# Patient Record
Sex: FEMALE | Race: AMERICAN INDIAN OR ALASKA NATIVE
[De-identification: names, ages, dates, MRNs, and addresses within clinical notes are randomized per-mention and may not be internally consistent; named-entity substitution may affect disease eponyms.]

---

## 2019-08-05 ENCOUNTER — HOSPITAL ENCOUNTER (EMERGENCY)
Dept: HOSPITAL 5 - ED | Age: 49
Discharge: HOME | End: 2019-08-05
Payer: MEDICAID

## 2019-08-05 VITALS — DIASTOLIC BLOOD PRESSURE: 97 MMHG | SYSTOLIC BLOOD PRESSURE: 152 MMHG

## 2019-08-05 DIAGNOSIS — Z98.890: ICD-10-CM

## 2019-08-05 DIAGNOSIS — Z88.8: ICD-10-CM

## 2019-08-05 DIAGNOSIS — E11.22: ICD-10-CM

## 2019-08-05 DIAGNOSIS — Z79.899: ICD-10-CM

## 2019-08-05 DIAGNOSIS — Z87.891: ICD-10-CM

## 2019-08-05 DIAGNOSIS — R10.9: Primary | ICD-10-CM

## 2019-08-05 DIAGNOSIS — N18.6: ICD-10-CM

## 2019-08-05 DIAGNOSIS — I12.0: ICD-10-CM

## 2019-08-05 DIAGNOSIS — Z98.84: ICD-10-CM

## 2019-08-05 DIAGNOSIS — Z99.2: ICD-10-CM

## 2019-08-05 LAB
ALBUMIN SERPL-MCNC: 4.2 G/DL (ref 3.9–5)
ALT SERPL-CCNC: 20 UNITS/L (ref 7–56)
BASOPHILS # (AUTO): 0 K/MM3 (ref 0–0.1)
BASOPHILS NFR BLD AUTO: 1 % (ref 0–1.8)
BILIRUB UR QL STRIP: (no result)
BLOOD UR QL VISUAL: (no result)
BUN SERPL-MCNC: 56 MG/DL (ref 7–17)
BUN/CREAT SERPL: 12 %
CALCIUM SERPL-MCNC: 10.8 MG/DL (ref 8.4–10.2)
EOSINOPHIL # BLD AUTO: 0.3 K/MM3 (ref 0–0.4)
EOSINOPHIL NFR BLD AUTO: 6.4 % (ref 0–4.3)
HCT VFR BLD CALC: 34.9 % (ref 30.3–42.9)
HEMOLYSIS INDEX: 5
HGB BLD-MCNC: 11.4 GM/DL (ref 10.1–14.3)
HYALINE CASTS #/AREA URNS LPF: 2 /LPF
LYMPHOCYTES # BLD AUTO: 1.6 K/MM3 (ref 1.2–5.4)
LYMPHOCYTES NFR BLD AUTO: 34.2 % (ref 13.4–35)
MCHC RBC AUTO-ENTMCNC: 33 % (ref 30–34)
MCV RBC AUTO: 92 FL (ref 79–97)
MONOCYTES # (AUTO): 0.7 K/MM3 (ref 0–0.8)
MONOCYTES % (AUTO): 13.8 % (ref 0–7.3)
MUCOUS THREADS #/AREA URNS HPF: (no result) /HPF
PH UR STRIP: 7 [PH] (ref 5–7)
PLATELET # BLD: 162 K/MM3 (ref 140–440)
PROT UR STRIP-MCNC: >500 MG/DL
RBC # BLD AUTO: 3.81 M/MM3 (ref 3.65–5.03)
RBC #/AREA URNS HPF: 2 /HPF (ref 0–6)
UROBILINOGEN UR-MCNC: < 2 MG/DL (ref ?–2)
WBC #/AREA URNS HPF: 2 /HPF (ref 0–6)

## 2019-08-05 PROCEDURE — 99284 EMERGENCY DEPT VISIT MOD MDM: CPT

## 2019-08-05 PROCEDURE — 81001 URINALYSIS AUTO W/SCOPE: CPT

## 2019-08-05 PROCEDURE — 74176 CT ABD & PELVIS W/O CONTRAST: CPT

## 2019-08-05 PROCEDURE — 96374 THER/PROPH/DIAG INJ IV PUSH: CPT

## 2019-08-05 PROCEDURE — 80053 COMPREHEN METABOLIC PANEL: CPT

## 2019-08-05 PROCEDURE — 83690 ASSAY OF LIPASE: CPT

## 2019-08-05 PROCEDURE — 85025 COMPLETE CBC W/AUTO DIFF WBC: CPT

## 2019-08-05 PROCEDURE — 36415 COLL VENOUS BLD VENIPUNCTURE: CPT

## 2019-08-05 PROCEDURE — 96375 TX/PRO/DX INJ NEW DRUG ADDON: CPT

## 2019-08-05 NOTE — EMERGENCY DEPARTMENT REPORT
HPI





- General


Chief Complaint: Abdominal Pain


Time Seen by Provider: 19 02:56





- HPI


HPI: 





Room 6








The patient is a 48-year-old female presenting with a chief complaint abdominal 

pain.  The patient states for the past 3-4 days she's had intermittent left-

sided abdominal pain described as sharp in nature.  Patient admits to nausea and

vomiting but denies diarrhea or fever.  The patient has a history of end-stage 

renal disease and states she does produce urine but denies dysuria.  Patient was

last dialyzed yesterday.  The patient gives her pain a score of 10/10.  The 

patient also notes she broke a bone in her back a little over 1 month ago and 

has a back brace at home








Location: [See above]


Duration: [See above]


Quality:  [See above]


Severity:  [See above]


Modifying factors: [see above]


Context: [see above]


Mode of transportation: [not driving]





ED Past Medical Hx





- Past Medical History


Previous Medical History?: Yes


Hx Hypertension: Yes


Hx Diabetes: Yes


Hx Renal Disease: Yes (ESRD)





- Surgical History


Past Surgical History?: Yes


Additional Surgical History: L arm fistula, gastric bypass





- Family History


Family history: no significant





- Social History


Smoking Status: Former Smoker (none 10 years)


Substance Use Type: None (denies illicit drug use)





- Medications


Home Medications: 


                                Home Medications











 Medication  Instructions  Recorded  Confirmed  Last Taken  Type


 


Famotidine [Pepcid] 20 mg PO BID #20 tablet 19  Unknown Rx


 


HYDROcodone/APAP 5-325 [[place] 1 - 2 each PO Q6HR PRN #14 tablet 19  

Unknown Rx





5/325]     














ED Review of Systems


ROS: 


Stated complaint: LEFT SIDE PAIN/ABD PAIN


Other details as noted in HPI





Constitutional: denies: fever


Eyes: denies: eye pain


ENT: denies: throat pain


Respiratory: no symptoms reported


Cardiovascular: denies: chest pain


Endocrine: no symptoms reported


Gastrointestinal: abdominal pain, nausea, vomiting.  denies: diarrhea


Genitourinary: denies: dysuria


Musculoskeletal: back pain (chronic)


Neurological: denies: headache





Physical Exam





- Physical Exam


Vital Signs: 


                                   Vital Signs











  19





  02:35


 


Temperature 98.1 F


 


Pulse Rate 107 H


 


Respiratory 21





Rate 


 


Blood Pressure 221/137


 


Blood Pressure 221/137





[Right] 


 


O2 Sat by Pulse 90





Oximetry 











Physical Exam: 





GENERAL: The patient is well-developed well-nourished female lying on stretcher 

not appearing to be in acute distress. []


HEENT: Normocephalic.  Atraumatic.  Extraocular motions are intact.  Patient has

 moist mucous membranes.


NECK: Supple.  Trachea midline


CHEST/LUNGS: Clear to auscultation.  There is no respiratory distress noted.


HEART/CARDIOVASCULAR: Regular.  There is no tachycardia.  There is no gallop rub

 or murmur.


ABDOMEN: Abdomen is soft, with tenderness to palpation left upper quadrant.  No 

rebound or guarding.  Patient has normal bowel sounds.  There is no abdominal 

distention.


SKIN: There is no rash.  There is no edema.  There is no diaphoresis.


NEURO: The patient is awake, alert, and oriented.  The patient is cooperative.  

The patient has normal speech


MUSCULOSKELETAL:  There is no evidence of acute injury.





ED Course


                                   Vital Signs











  19





  02:35


 


Temperature 98.1 F


 


Pulse Rate 107 H


 


Respiratory 21





Rate 


 


Blood Pressure 221/137


 


Blood Pressure 221/137





[Right] 


 


O2 Sat by Pulse 90





Oximetry 














ED Medical Decision Making





- Lab Data


Result diagrams: 


                                 19 03:14





                                 19 03:14





                                Laboratory Tests











  19





  03:14 03:14 03:14


 


WBC  4.8  


 


RBC  3.81  


 


Hgb  11.4  


 


Hct  34.9  


 


MCV  92  


 


MCH  30  


 


MCHC  33  


 


RDW  18.7 H  


 


Plt Count  162  


 


Lymph % (Auto)  34.2  


 


Mono % (Auto)  13.8 H  


 


Eos % (Auto)  6.4 H  


 


Baso % (Auto)  1.0  


 


Lymph #  1.6  


 


Mono #  0.7  


 


Eos #  0.3  


 


Baso #  0.0  


 


Seg Neutrophils %  44.6  


 


Seg Neutrophils #  2.1  


 


Sodium   140 


 


Potassium   4.4 


 


Chloride   96.7 L 


 


Carbon Dioxide   22 


 


Anion Gap   26 


 


BUN   56 H 


 


Creatinine   4.5 H 


 


Estimated GFR   13 


 


BUN/Creatinine Ratio   12 


 


Glucose   98 


 


Calcium   10.8 H 


 


Total Bilirubin   0.70 


 


AST   30 


 


ALT   20 


 


Alkaline Phosphatase   164 H 


 


Total Protein   8.2 


 


Albumin   4.2 


 


Albumin/Globulin Ratio   1.1 


 


Lipase    69 H


 


Urine Color   


 


Urine Turbidity   


 


Urine pH   


 


Ur Specific Gravity   


 


Urine Protein   


 


Urine Glucose (UA)   


 


Urine Ketones   


 


Urine Blood   


 


Urine Nitrite   


 


Urine Bilirubin   


 


Urine Urobilinogen   


 


Ur Leukocyte Esterase   


 


Urine WBC (Auto)   


 


Urine RBC (Auto)   


 


Hyaline Casts   


 


Urine Mucus   














  19





  03:54


 


WBC 


 


RBC 


 


Hgb 


 


Hct 


 


MCV 


 


MCH 


 


MCHC 


 


RDW 


 


Plt Count 


 


Lymph % (Auto) 


 


Mono % (Auto) 


 


Eos % (Auto) 


 


Baso % (Auto) 


 


Lymph # 


 


Mono # 


 


Eos # 


 


Baso # 


 


Seg Neutrophils % 


 


Seg Neutrophils # 


 


Sodium 


 


Potassium 


 


Chloride 


 


Carbon Dioxide 


 


Anion Gap 


 


BUN 


 


Creatinine 


 


Estimated GFR 


 


BUN/Creatinine Ratio 


 


Glucose 


 


Calcium 


 


Total Bilirubin 


 


AST 


 


ALT 


 


Alkaline Phosphatase 


 


Total Protein 


 


Albumin 


 


Albumin/Globulin Ratio 


 


Lipase 


 


Urine Color  Yellow


 


Urine Turbidity  Clear


 


Urine pH  7.0


 


Ur Specific Gravity  1.013


 


Urine Protein  >500


 


Urine Glucose (UA)  50


 


Urine Ketones  Neg


 


Urine Blood  Neg


 


Urine Nitrite  Neg


 


Urine Bilirubin  Neg


 


Urine Urobilinogen  < 2.0


 


Ur Leukocyte Esterase  Neg


 


Urine WBC (Auto)  2.0


 


Urine RBC (Auto)  2.0


 


Hyaline Casts  2


 


Urine Mucus  Few














- Radiology Data


Radiology results: report reviewed (CT abdomen and pelvis), image reviewed (CT 

abdomen and pelvis)





[address] [address] [place]

Scan Report Signed Patient: TIKI WHEAT MR#: [id number] : 1970 

Acct:[id number] Age/Sex: 48 / F ADM Date: 19 Loc: ED Attending Dr: 

Ordering Physician: HONORIO BROWNLEE MD Date of Service: 19 Procedure(s): CT 

abdomen pelvis wo con Accession Number(s): [id number] cc: HONORIO BROWNLEE MD CT of the

abdomen and pelvis without contrast INDICATION: Left-sided abdominal pain 

COMPARISON: None FINDINGS: There is moderately severe cardiomegaly with a 

moderate-sized right pleural effusion. The liver, spleen, pancreas and adrenal 

glands are grossly normal. Kidneys are extremely atrophic consistent with renal 

failure. Gallbladder has been removed. No biliary tree dilation is seen. No 

fluid or adenopathy in the upper abdomen. Postoperative changes are seen in the 

left upper quadrant with a moderately dilated fluid-filled structure in the left

upper quadrant appearing to be remaining stomach. No perforation is seen. There 

is a small amount of ascites in the upper abdomen. There is moderate vascular 

calcification without aneurysm. CT of the pelvis shows generalized anasarca. 

There is a small amount of free pelvic fluid. No uterine or adnexal masses. No 

definite bowel obstruction. Appendix is not seen. Bony changes are seen 

consistent with renal osteodystrophy with a severely comminuted fracture of L3 

seen. IMPRESSION: Fluid-filled stomach with other chronic findings as described.

No definite acute process seen. Automated exposure control was utilized to 

diminish radiation dose. Signer Name: Chilango Mckinney MD Signed: 2019 4:17 AM

Workstation Name: [id number] Transcribed By: BRONWYN Dictated By: Chilango Mckinney MD Electronically Authenticated By: Chilango Mckinney MD Signed Date/Time: 

19 DD/DT: 19 TD/TT: 





- Differential Diagnosis


diverticulitis, peptic ulcer disease,


Critical care attestation.: 


If time is entered above; I have spent that time in minutes in the direct care 

of this critically ill patient, excluding procedure time.








ED Disposition


Clinical Impression: 


 Acute abdominal pain





Disposition: - TO HOME OR SELFCARE


Is pt being admited?: No


Does the pt Need Aspirin: No


Condition: Stable


Instructions:  Abdominal Pain (ED)


Additional Instructions: 


Return to the emergency department immediately should you develop worsening 

symptoms, fever, inability to tolerate food or liquid or any other concerns.


Prescriptions: 


HYDROcodone/APAP 5-325 [Norco 5/325] 1 - 2 each PO Q6HR PRN #14 tablet


 PRN Reason: Pain


Famotidine [Pepcid] 20 mg PO BID #20 tablet


Referrals: 


PRIMARY CARE,MD [Primary Care Provider] - 3-5 Days


Time of Disposition: 05:17

## 2019-08-05 NOTE — CAT SCAN REPORT
CT of the abdomen and pelvis without contrast



INDICATION: Left-sided abdominal pain



COMPARISON: None



FINDINGS: There is moderately severe cardiomegaly with a moderate-sized right pleural effusion. The l
iver, spleen, pancreas and adrenal glands are grossly normal. Kidneys are extremely atrophic consiste
nt with renal failure. Gallbladder has been removed. No biliary tree dilation is seen. No fluid or ad
enopathy in the upper abdomen. Postoperative changes are seen in the left upper quadrant with a moder
ately dilated fluid-filled structure in the left upper quadrant appearing to be remaining stomach. No
 perforation is seen. There is a small amount of ascites in the upper abdomen. There is moderate vasc
ular calcification without aneurysm.



CT of the pelvis shows generalized anasarca. There is a small amount of free pelvic fluid. No uterine
 or adnexal masses. No definite bowel obstruction. Appendix is not seen. Bony changes are seen consis
tent with renal osteodystrophy with a severely comminuted fracture of L3 seen.



IMPRESSION: Fluid-filled stomach with other chronic findings as described. No definite acute process 
seen.



Automated exposure control was utilized to diminish radiation dose.







Signer Name: Chilango Mckinney MD 

Signed: 8/5/2019 4:17 AM

 Workstation Name: [id number]

## 2020-10-17 ENCOUNTER — HOSPITAL ENCOUNTER (INPATIENT)
Dept: HOSPITAL 5 - ED | Age: 50
LOS: 2 days | Discharge: HOME | DRG: 280 | End: 2020-10-19
Attending: INTERNAL MEDICINE | Admitting: INTERNAL MEDICINE
Payer: MEDICAID

## 2020-10-17 DIAGNOSIS — Z86.73: ICD-10-CM

## 2020-10-17 DIAGNOSIS — Z79.82: ICD-10-CM

## 2020-10-17 DIAGNOSIS — Z53.29: ICD-10-CM

## 2020-10-17 DIAGNOSIS — I13.2: ICD-10-CM

## 2020-10-17 DIAGNOSIS — Z79.899: ICD-10-CM

## 2020-10-17 DIAGNOSIS — Z99.2: ICD-10-CM

## 2020-10-17 DIAGNOSIS — N18.6: ICD-10-CM

## 2020-10-17 DIAGNOSIS — E78.5: ICD-10-CM

## 2020-10-17 DIAGNOSIS — Z82.49: ICD-10-CM

## 2020-10-17 DIAGNOSIS — Z91.14: ICD-10-CM

## 2020-10-17 DIAGNOSIS — E11.22: ICD-10-CM

## 2020-10-17 DIAGNOSIS — I21.4: Primary | ICD-10-CM

## 2020-10-17 DIAGNOSIS — I50.31: ICD-10-CM

## 2020-10-17 DIAGNOSIS — Z91.013: ICD-10-CM

## 2020-10-17 LAB
BASOPHILS # (AUTO): 0.1 K/MM3 (ref 0–0.1)
BASOPHILS NFR BLD AUTO: 0.9 % (ref 0–1.8)
BUN SERPL-MCNC: 42 MG/DL (ref 7–17)
BUN/CREAT SERPL: 6 %
CALCIUM SERPL-MCNC: 10.2 MG/DL (ref 8.4–10.2)
EOSINOPHIL # BLD AUTO: 0.6 K/MM3 (ref 0–0.4)
EOSINOPHIL NFR BLD AUTO: 7 % (ref 0–4.3)
HCT VFR BLD CALC: 36.6 % (ref 30.3–42.9)
HDLC SERPL-MCNC: 40 MG/DL (ref 40–59)
HEMOLYSIS INDEX: 12
HGB BLD-MCNC: 12.3 GM/DL (ref 10.1–14.3)
LYMPHOCYTES # BLD AUTO: 1.8 K/MM3 (ref 1.2–5.4)
LYMPHOCYTES NFR BLD AUTO: 20.1 % (ref 13.4–35)
MCHC RBC AUTO-ENTMCNC: 34 % (ref 30–34)
MCV RBC AUTO: 92 FL (ref 79–97)
MONOCYTES # (AUTO): 1 K/MM3 (ref 0–0.8)
MONOCYTES % (AUTO): 11.6 % (ref 0–7.3)
PLATELET # BLD: 186 K/MM3 (ref 140–440)
RBC # BLD AUTO: 3.96 M/MM3 (ref 3.65–5.03)

## 2020-10-17 PROCEDURE — 96375 TX/PRO/DX INJ NEW DRUG ADDON: CPT

## 2020-10-17 PROCEDURE — 71250 CT THORAX DX C-: CPT

## 2020-10-17 PROCEDURE — 36415 COLL VENOUS BLD VENIPUNCTURE: CPT

## 2020-10-17 PROCEDURE — 80061 LIPID PANEL: CPT

## 2020-10-17 PROCEDURE — 71045 X-RAY EXAM CHEST 1 VIEW: CPT

## 2020-10-17 PROCEDURE — 80048 BASIC METABOLIC PNL TOTAL CA: CPT

## 2020-10-17 PROCEDURE — 96367 TX/PROPH/DG ADDL SEQ IV INF: CPT

## 2020-10-17 PROCEDURE — 93005 ELECTROCARDIOGRAM TRACING: CPT

## 2020-10-17 PROCEDURE — 82962 GLUCOSE BLOOD TEST: CPT

## 2020-10-17 PROCEDURE — 85025 COMPLETE CBC W/AUTO DIFF WBC: CPT

## 2020-10-17 PROCEDURE — 84484 ASSAY OF TROPONIN QUANT: CPT

## 2020-10-17 PROCEDURE — 96365 THER/PROPH/DIAG IV INF INIT: CPT

## 2020-10-17 PROCEDURE — 94760 N-INVAS EAR/PLS OXIMETRY 1: CPT

## 2020-10-17 RX ADMIN — MORPHINE SULFATE PRN MG: 2 INJECTION, SOLUTION INTRAMUSCULAR; INTRAVENOUS at 20:25

## 2020-10-17 RX ADMIN — Medication SCH: at 23:30

## 2020-10-17 NOTE — XRAY REPORT
CHEST 1 VIEW 



INDICATION / CLINICAL INFORMATION:

Chest Pain.



COMPARISON: 

3/12/2020



FINDINGS:

Patient is rotated.

SUPPORT DEVICES: None.

HEART / MEDIASTINUM: No significant abnormality. 

LUNGS / PLEURA: Lungs appear unchanged given differences in positioning...  No pneumothorax. 



ADDITIONAL FINDINGS: No significant additional findings.



IMPRESSION:

1. No significant change.



Signer Name: Giuseppe Caruso MD 

Signed: 10/17/2020 7:05 AM

Workstation Name: [id number]

## 2020-10-17 NOTE — EMERGENCY DEPARTMENT REPORT
ED General Adult HPI





- General


Chief complaint: Chest Pain


Stated complaint: BODYACHES


Time Seen by Provider: 10/17/20 07:33


Source: patient


Mode of arrival: Ambulatory


Limitations: No Limitations





- History of Present Illness


Initial comments: 





Patient is 49 years old female with history of hypertension, diabetes and end-

stage renal disease on hemodialysis.  Last dialysis was Thursday.  Patient 

presented to the ER complaining of pain to the chest, left-sided sharp pain, 

similar to her previous chest pain.  Patient also complaining of back pain and 

hip pain.  Patient stated that she had a broken bone in her hip and the back.  

Patient stated that the pain is been going on for few days however is similar to

what she had before.  No recent injury or trauma.  Patient denied any shortness 

of breath, fever or chills.  No nausea or vomiting.





- Related Data


                                Home Medications











 Medication  Instructions  Recorded  Confirmed  Last Taken


 


Gabapentin 100 mg PO BID 03/14/20 04/25/20 03/08/20


 


traMADoL [Ultram 50 MG tab] 50 mg PO PRN 03/14/20 04/25/20 Unknown








                                  Previous Rx's











 Medication  Instructions  Recorded  Last Taken  Type


 


Aspirin EC [Ecotrin] 325 mg PO QDAY #30 tablet. 03/14/20 Unknown Rx


 


AtorvaSTATin [Lipitor] 20 mg PO QHS #30 tablet 03/14/20 Unknown Rx


 


Metoprolol [Lopressor TAB] 50 mg PO BID #60 tablet 03/14/20 Unknown Rx


 


Cinacalcet HCl [Sensipar] 30 mg PO QDAY #30 04/26/20 Unknown Rx


 


Epoetin Jayy 10,000 Unit [Procrit] 10,000 unit IV SAVANNAH PRN #1 vial 04/26/20 

Unknown Rx


 


Famotidine [Pepcid] 20 mg PO QDAY #30 04/26/20 Unknown Rx


 


HYDROcodone/APAP 5-325 [[place] 1 tab PO Q6HR PRN #14 tablet 04/26/20 Unknown Rx





5-325 mg TAB]    


 


cloNIDine 0.1 mg PO QDAY #30 04/26/20 03/08/20 Rx


 


methylPREDNISolone [Medrol 4MG 1 tab PO QDAY #1 tab.ds.pk 04/26/20 Unknown Rx





DOSEPAK (21 tabs)]    


 


oxyCODONE /ACETAMINOPHEN [Percocet 1 tab PO Q4H PRN #15 04/26/20 Unknown Rx





5/325 mg]    











                                    Allergies











Allergy/AdvReac Type Severity Reaction Status Date / Time


 


diphenhydramine Allergy  Unknown Verified 08/05/19 02:48





[From Benadryl]     


 


lisinopril Allergy  Unknown Verified 08/05/19 02:48


 


seafood Allergy  Angioedema Uncoded 04/26/20 11:58














ED Review of Systems


ROS: 


Stated complaint: BODYACHES


Other details as noted in HPI





Comment: All other systems reviewed and negative


Constitutional: denies: chills, fever


Respiratory: denies: cough, shortness of breath, SOB with exertion, SOB at rest


Cardiovascular: chest pain.  denies: palpitations, dyspnea on exertion, 

orthopnea


Gastrointestinal: denies: abdominal pain, nausea, vomiting


Musculoskeletal: back pain, arthralgia, myalgia


Neurological: denies: headache, weakness, numbness





ED Past Medical Hx





- Past Medical History


Previous Medical History?: Yes


Hx Hypertension: Yes


Hx CVA: Yes


Hx Diabetes: Yes


Hx Renal Disease: Yes (ESRD)





- Surgical History


Past Surgical History?: Yes


Additional Surgical History: L arm fistula, gastric bypass





- Social History


Smoking Status: Current Every Day Smoker


Substance Use Type: None





- Medications


Home Medications: 


                                Home Medications











 Medication  Instructions  Recorded  Confirmed  Last Taken  Type


 


Aspirin EC [Ecotrin] 325 mg PO QDAY #30 tablet. 03/14/20 04/25/20 Unknown Rx


 


AtorvaSTATin [Lipitor] 20 mg PO QHS #30 tablet 03/14/20 04/25/20 Unknown Rx


 


Gabapentin 100 mg PO BID 03/14/20 04/25/20 03/08/20 History


 


Metoprolol [Lopressor TAB] 50 mg PO BID #60 tablet 03/14/20 04/25/20 Unknown Rx


 


traMADoL [Ultram 50 MG tab] 50 mg PO PRN 03/14/20 04/25/20 Unknown History


 


Cinacalcet HCl [Sensipar] 30 mg PO QDAY #30 04/26/20 04/25/20 Unknown Rx


 


Epoetin Jayy 10,000 Unit [Procrit] 10,000 unit IV SAVANNAH PRN #1 vial 04/26/20  

Unknown Rx


 


Famotidine [Pepcid] 20 mg PO QDAY #30 04/26/20 04/25/20 Unknown Rx


 


HYDROcodone/APAP 5-325 [[place] 1 tab PO Q6HR PRN #14 tablet 04/26/20 04/25/20 

Unknown Rx





5-325 mg TAB]     


 


cloNIDine 0.1 mg PO QDAY #30 04/26/20 04/25/20 03/08/20 Rx


 


methylPREDNISolone [Medrol 4MG 1 tab PO QDAY #1 tab.ds.pk 04/26/20  Unknown Rx





DOSEPAK (21 tabs)]     


 


oxyCODONE /ACETAMINOPHEN [Percocet 1 tab PO Q4H PRN #15 04/26/20 04/25/20 

Unknown Rx





5/325 mg]     














ED Physical Exam





- General


Limitations: No Limitations


General appearance: alert, in no apparent distress





- Head


Head exam: Present: atraumatic, normocephalic, normal inspection





- Eye


Eye exam: Present: normal appearance





- ENT


ENT exam: Present: normal exam, normal orophraynx, mucous membranes moist





- Neck


Neck exam: Present: normal inspection, full ROM.  Absent: tenderness, 

meningismus, lymphadenopathy, thyromegaly





- Respiratory


Respiratory exam: Present: normal lung sounds bilaterally.  Absent: respiratory 

distress, wheezes, rales, rhonchi, chest wall tenderness, accessory muscle use, 

decreased breath sounds, prolonged expiratory





- Cardiovascular


Cardiovascular Exam: Present: regular rate, normal rhythm, normal heart sounds





- GI/Abdominal


GI/Abdominal exam: Present: soft, normal bowel sounds.  Absent: distended, 

tenderness, guarding, rebound, rigid, organomegaly, mass, bruit, pulsatile mass,

hernia





- Extremities Exam


Extremities exam: Present: normal inspection, full ROM, normal capillary refill.

 Absent: tenderness, pedal edema, joint swelling





- Back Exam


Back exam: Present: normal inspection.  Absent: CVA tenderness (R), CVA 

tenderness (L)





- Neurological Exam


Neurological exam: Present: alert, oriented X3, CN II-XII intact





- Skin


Skin exam: Present: warm, intact, normal color





ED Course





                                   Vital Signs











  10/17/20





  05:51


 


Temperature 98.5 F


 


Pulse Rate 101 H


 


Respiratory 18





Rate 


 


Blood Pressure 172/109


 


O2 Sat by Pulse 94





Oximetry 














ED Medical Decision Making





- Lab Data


Result diagrams: 


                                 10/17/20 06:32





                                 10/17/20 06:32


Critical care attestation.: 


If time is entered above; I have spent that time in minutes in the direct care 

of this critically ill patient, excluding procedure time.








ED Disposition


Condition: Stable


Referrals: 


PRIMARY CARE,MD [Primary Care Provider] - 3-5 Days

## 2020-10-17 NOTE — EMERGENCY DEPARTMENT REPORT
ED Chest Pain HPI





- General


Chief Complaint: Chest Pain


Stated Complaint: BODYACHES


Time Seen by Provider: 10/17/20 07:33


Source: patient


Mode of arrival: Ambulatory


Limitations: No Limitations





- History of Present Illness


Initial Comments: 





Patient is 49 years old female with history of end-stage renal disease on 

hemodialysis.  Patient presented to the ER complaining of left-sided chest pain 

started this morning.  Patient described her pain as heaviness with no 

radiation.  Patient denied any shortness of breath, cough or fever.  Patient is 

also complaining of lower back pain and hip pain which is chronic according to 

the patient.


MD Complaint: chest pain


-: This morning


Onset: during rest


Pain Location: left chest


Severity scale (0 -10): 7


Quality: heaviness





- Related Data


                                Home Medications











 Medication  Instructions  Recorded  Confirmed  Last Taken


 


Gabapentin 100 mg PO BID 03/14/20 04/25/20 03/08/20


 


traMADoL [Ultram 50 MG tab] 50 mg PO PRN 03/14/20 04/25/20 Unknown








                                  Previous Rx's











 Medication  Instructions  Recorded  Last Taken  Type


 


Aspirin EC [Ecotrin] 325 mg PO QDAY #30 tablet.dr 03/14/20 Unknown Rx


 


AtorvaSTATin [Lipitor] 20 mg PO QHS #30 tablet 03/14/20 Unknown Rx


 


Metoprolol [Lopressor TAB] 50 mg PO BID #60 tablet 03/14/20 Unknown Rx


 


Cinacalcet HCl [Sensipar] 30 mg PO QDAY #30 04/26/20 Unknown Rx


 


Epoetin Jayy 10,000 Unit [Procrit] 10,000 unit IV SAVANNAH PRN #1 vial 04/26/20 

Unknown Rx


 


Famotidine [Pepcid] 20 mg PO QDAY #30 04/26/20 Unknown Rx


 


HYDROcodone/APAP 5-325 [Norco 1 tab PO Q6HR PRN #14 tablet 04/26/20 Unknown Rx





5-325 mg TAB]    


 


cloNIDine 0.1 mg PO QDAY #30 04/26/20 03/08/20 Rx


 


methylPREDNISolone [Medrol 4MG 1 tab PO QDAY #1 tab.ds.pk 04/26/20 Unknown Rx





DOSEPAK (21 tabs)]    


 


oxyCODONE /ACETAMINOPHEN [Percocet 1 tab PO Q4H PRN #15 04/26/20 Unknown Rx





5/325 mg]    











                                    Allergies











Allergy/AdvReac Type Severity Reaction Status Date / Time


 


diphenhydramine Allergy  Unknown Verified 08/05/19 02:48





[From Benadryl]     


 


lisinopril Allergy  Unknown Verified 08/05/19 02:48


 


seafood Allergy  Angioedema Uncoded 04/26/20 11:58














Heart Score





- HEART Score


History: Moderately suspicious


EKG: Non-specific


Age: 45-65


Risk factors: > 3 risk factors or hx of atherosclerotic disease


Troponin: > 3x normal limit


HEART Score: 7





- Critical Actions


Critical Actions: >7 pts:50-65% risk of adverse cardiac event. Early invasive 

measures





ED Review of Systems


ROS: 


Stated complaint: BODYACHES


Other details as noted in HPI





Comment: All other systems reviewed and negative


Constitutional: denies: chills, fever


Respiratory: denies: cough, shortness of breath, SOB with exertion


Cardiovascular: chest pain


Gastrointestinal: denies: abdominal pain, nausea, vomiting


Musculoskeletal: denies: back pain





ED Past Medical Hx





- Past Medical History


Previous Medical History?: Yes


Hx Hypertension: Yes


Hx CVA: Yes


Hx Diabetes: Yes


Hx Renal Disease: Yes (ESRD)





- Surgical History


Past Surgical History?: Yes


Additional Surgical History: L arm fistula, gastric bypass





- Social History


Smoking Status: Current Every Day Smoker


Substance Use Type: None





- Medications


Home Medications: 


                                Home Medications











 Medication  Instructions  Recorded  Confirmed  Last Taken  Type


 


Aspirin EC [Ecotrin] 325 mg PO QDAY #30 tablet. 03/14/20 04/25/20 Unknown Rx


 


AtorvaSTATin [Lipitor] 20 mg PO QHS #30 tablet 03/14/20 04/25/20 Unknown Rx


 


Gabapentin 100 mg PO BID 03/14/20 04/25/20 03/08/20 History


 


Metoprolol [Lopressor TAB] 50 mg PO BID #60 tablet 03/14/20 04/25/20 Unknown Rx


 


traMADoL [Ultram 50 MG tab] 50 mg PO PRN 03/14/20 04/25/20 Unknown History


 


Cinacalcet HCl [Sensipar] 30 mg PO QDAY #30 04/26/20 04/25/20 Unknown Rx


 


Epoetin Jayy 10,000 Unit [Procrit] 10,000 unit IV SAVANNAH PRN #1 vial 04/26/20  

Unknown Rx


 


Famotidine [Pepcid] 20 mg PO QDAY #30 04/26/20 04/25/20 Unknown Rx


 


HYDROcodone/APAP 5-325 [Norco 1 tab PO Q6HR PRN #14 tablet 04/26/20 04/25/20 

Unknown Rx





5-325 mg TAB]     


 


cloNIDine 0.1 mg PO QDAY #30 04/26/20 04/25/20 03/08/20 Rx


 


methylPREDNISolone [Medrol 4MG 1 tab PO QDAY #1 tab.ds.pk 04/26/20  Unknown Rx





DOSEPAK (21 tabs)]     


 


oxyCODONE /ACETAMINOPHEN [Percocet 1 tab PO Q4H PRN #15 04/26/20 04/25/20 

Unknown Rx





5/325 mg]     














ED Physical Exam





- General


Limitations: No Limitations


General appearance: alert, in no apparent distress





- Head


Head exam: Present: atraumatic, normocephalic, normal inspection





- Eye


Eye exam: Present: normal appearance





- ENT


ENT exam: Present: normal exam, normal orophraynx, mucous membranes moist





- Neck


Neck exam: Present: normal inspection, full ROM.  Absent: tenderness, 

meningismus





- Respiratory


Respiratory exam: Present: normal lung sounds bilaterally





- Cardiovascular


Cardiovascular Exam: Present: regular rate, normal rhythm, normal heart sounds





- GI/Abdominal


GI/Abdominal exam: Present: soft, normal bowel sounds.  Absent: distended, 

tenderness, guarding, rebound, rigid, organomegaly, mass, bruit, pulsatile mass,

hernia





- Extremities Exam


Extremities exam: Present: normal inspection, full ROM, normal capillary refill.

 Absent: tenderness





- Back Exam


Back exam: Present: normal inspection, full ROM.  Absent: CVA tenderness (R), 

CVA tenderness (L)





- Neurological Exam


Neurological exam: Present: alert, oriented X3, CN II-XII intact





- Psychiatric


Psychiatric exam: Present: normal mood





- Skin


Skin exam: Present: warm, intact, normal color





ED Course


                                   Vital Signs











  10/17/20 10/17/20 10/17/20





  05:51 07:36 07:46


 


Temperature 98.5 F  


 


Pulse Rate 101 H 105 H 104 H


 


Respiratory 18 17 23





Rate   


 


Blood Pressure 172/109  157/100


 


Blood Pressure   





[Right]   


 


O2 Sat by Pulse 94 87 91





Oximetry   














  10/17/20 10/17/20 10/17/20





  07:56 08:00 08:46


 


Temperature 98.3 F  


 


Pulse Rate  95 H 89


 


Respiratory  21 12





Rate   


 


Blood Pressure  183/113 157/100


 


Blood Pressure   





[Right]   


 


O2 Sat by Pulse  95 98





Oximetry   














  10/17/20 10/17/20 10/17/20





  09:46 10:16 11:54


 


Temperature   


 


Pulse Rate 87 82 80


 


Respiratory 13 12 12





Rate   


 


Blood Pressure 113/79 110/68 


 


Blood Pressure   121/77





[Right]   


 


O2 Sat by Pulse 98 98 99





Oximetry   














ED Medical Decision Making





- Lab Data


Result diagrams: 


                                 10/17/20 06:32





                                 10/17/20 06:32





- EKG Data


-: EKG Interpreted by Me


EKG shows normal: sinus rhythm





- EKG Data


Interpretation: no acute changes





- Radiology Data


Radiology results: report reviewed





- Medical Decision Making





Patient is 49 years old female with history of end-stage renal disease on 

hemodialysis.  Patient presented to the ER complaining of left-sided chest pain 

started this morning.  Patient described her pain as heaviness with no 

radiation.  Patient denied any shortness of breath, cough or fever.  Patient is 

also complaining of lower back pain and hip pain which is chronic according to 

the patient.





EKG showed no ST elevation.  Labs reviewed and showed elevated troponin with 

consequent further elevation.  Patient received aspirin and morphine and stated 

that it helped with her pain.  Patient has been seen by  urgency room 

and recommended patient to be admitted for further management.  I discussed the 

patient with Dr. Ng, he agreed to admit the patient to medical service for 

further management.


Critical Care Time: Yes


Critical care time in (mins) excluding proc time.: 30


Critical care attestation.: 


If time is entered above; I have spent that time in minutes in the direct care 

of this critically ill patient, excluding procedure time.








ED Disposition


Clinical Impression: 


 NSTEMI (non-ST elevated myocardial infarction), ESRD (end stage renal disease) 

on dialysis





Disposition: DC-09 OP ADMIT IP TO THIS HOSP


Is pt being admited?: Yes


Condition: Stable


Referrals: 


PRIMARY CARE,MD [Primary Care Provider] - 3-5 Days

## 2020-10-17 NOTE — HISTORY AND PHYSICAL REPORT
History of Present Illness


Chief complaint: 





I am having pain in my chest


History of present illness: 


50 YO Female with HTN,HLD, GERD,  ESRD on HD (T,R,Sa), DM, Diastolic CHF present

to ED for evaluation.  Patient states that she was in her usual state of health 

at bedtime around 2100 hrs.  Patient states that she awoke from sleep this 

morning at approximately 0830 hrs. and experienced a sudden onset pain in her 

chest.  Patient states the pain is 7/10, constant, localized to the left chest, 

crushing in nature, nonradiating, not worsened with exertion, not relieved with 

rest.  Patient transported to [facility] via private vehicle for further care and 

evaluation of the aforementioned symptoms.  Patient seen and evaluated in the 

emergency department.  All lab and imaging studies reviewed.  Patient found to 

have non-ST elevation MI, diastolic congestive heart failure, as well as end-

stage renal disease in need of dialysis.  Echocardiogram and stress test from 

March 2020 reviewed.  Cardiology team consulted in ED.  Patient admitted to 

telemetry for further care and evaluation due to increased risk of cardiac 

decompensation.  Nephrology team consulted in ED.  Further care as per 

cardiology team.  Patient denies fever, chills, chest pain, shortness of breath,

productive cough, recent ill contacts, unilateral leg swelling, calf pain, 

prolonged travel/immobility, individual/family history of DVT/PE/bleeding/blood 

clot disorders, or known exposure to COVID-19.  All medication listed at time of

admission has been reconciled.  Prior admission on 4/25/2020 reviewed.








Past History


Past Medical History: diabetes, ESRD, hypertension, stroke


Past Surgical History: bowel surgery, Other (Dialysis access)


Social history: single.  denies: smoking


Family history: hypertension





Medications and Allergies


                                    Allergies











Allergy/AdvReac Type Severity Reaction Status Date / Time


 


diphenhydramine Allergy  Unknown Verified 08/05/19 02:48





[From Benadryl]     


 


lisinopril Allergy  Unknown Verified 08/05/19 02:48


 


seafood Allergy  Angioedema Uncoded 04/26/20 11:58











                                Home Medications











 Medication  Instructions  Recorded  Confirmed  Last Taken  Type


 


Aspirin EC [Ecotrin] 325 mg PO QDAY #30 tablet. 03/14/20 10/17/20 Unknown Rx


 


AtorvaSTATin [Lipitor] 20 mg PO QHS #30 tablet 03/14/20 10/17/20 Unknown Rx


 


Gabapentin 100 mg PO BID 03/14/20 10/17/20 03/08/20 History


 


Metoprolol [Lopressor TAB] 50 mg PO BID #60 tablet 03/14/20 10/17/20 Unknown Rx


 


traMADoL [Ultram 50 MG tab] 50 mg PO PRN 03/14/20 10/17/20 Unknown History


 


Cinacalcet HCl [Sensipar] 30 mg PO QDAY #30 04/26/20 10/17/20 Unknown Rx


 


Epoetin Jayy 10,000 Unit [Procrit] 10,000 unit IV SAVANNAH PRN #1 vial 04/26/20 

10/17/20 Unknown Rx


 


Famotidine [Pepcid] 20 mg PO QDAY #30 04/26/20 10/17/20 Unknown Rx


 


HYDROcodone/APAP 5-325 [Norco 1 tab PO Q6HR PRN #14 tablet 04/26/20 10/17/20 

Unknown Rx





5-325 mg TAB]     


 


cloNIDine 0.1 mg PO QDAY #30 04/26/20 10/17/20 03/08/20 Rx


 


methylPREDNISolone [Medrol 4MG 1 tab PO QDAY #1 tab.ds.pk 04/26/20 10/17/20 

Unknown Rx





DOSEPAK (21 tabs)]     


 


oxyCODONE /ACETAMINOPHEN [Percocet 1 tab PO Q4H PRN #15 04/26/20 10/17/20 Unkno

wn Rx





5/325 mg]     











Active Meds: 


Active Medications





Acetaminophen (Tylenol)  650 mg PO Q4H PRN


   PRN Reason: Pain MILD(1-3)/Fever >100.5/HA


Albuterol (Proventil)  2.5 mg IH Q4HRT PRN


   PRN Reason: Shortness Of Breath


Ondansetron HCl (Zofran)  4 mg IV Q8H PRN


   PRN Reason: Nausea And Vomiting


Sodium Chloride (Sodium Chloride Flush Syringe 10 Ml)  10 ml IV BID THU


Sodium Chloride (Sodium Chloride Flush Syringe 10 Ml)  10 ml IV PRN PRN


   PRN Reason: LINE FLUSH











Review of Systems


Constitutional: no weight loss, no weight gain, no sweats


Ears, nose, mouth and throat: no ear pain, no tinnitis, no nose pain, no nasal 

discharge, no sinus pressure


Breasts: no change in shape, no swelling, no mass


Cardiovascular: chest pain, no orthopnea, no palpitations, no rapid/irregular 

heart beat, no syncope, no paroxysmal nocturnal dyspnea, no high blood pressure


Respiratory: no cough, no cough with sputum, no hemoptysis, no shortness of 

breath


Gastrointestinal: no abdominal pain, no nausea, no vomiting, no diarrhea, no 

change in bowel habits


Genitourinary Female: no pelvic pain, no flank pain, no dysuria, no urinary 

frequency, no urgency


Rectal: no pain, no incontinence, no bleeding


Musculoskeletal: no neck stiffness, no neck pain, no shooting arm pain, no arm 

numbness/tingling, no low back pain, no shooting leg pain


Integumentary: no rash, no pruritis, no redness, no sores, no wounds


Neurological: no paralysis, no parathesias, no numbness, no seizures, no tremors


Psychiatric: no anxiety, no change in sleep habits, no sleep disturbances, no 

hypersomnia, no change in appetite, no change in libido, no suicidal ideation


Endocrine: no cold intolerance, no polydipsia, no nocturia, no flushing


Hematologic/Lymphatic: no easy bruising, no easy bleeding


Allergic/Immunologic: no urticaria, no allergic rhinitis





Exam





- Constitutional


Vitals: 


                                        











Temp Pulse Resp BP Pulse Ox


 


 98.3 F   80   12   121/77   99 


 


 10/17/20 07:56  10/17/20 11:54  10/17/20 11:54  10/17/20 11:54  10/17/20 11:54











General appearance: Present: mild distress





- EENT


Eyes: Present: PERRL


ENT: hearing intact, clear oral mucosa





- Neck


Neck: Present: supple, normal ROM





- Respiratory


Respiratory effort: normal


Respiratory: bilateral: CTA





- Cardiovascular


Heart Sounds: Present: S1 & S2.  Absent: rub, click





- Extremities


Extremities: pulses symmetrical, No edema


Peripheral Pulses: within normal limits





- Abdominal


General gastrointestinal: Present: soft, non-tender, non-distended, normal bowel

sounds


Female genitourinary: Present: normal





- Integumentary


Integumentary: Present: clear, warm, dry





- Musculoskeletal


Musculoskeletal: gait normal, strength equal bilaterally





- Psychiatric


Psychiatric: appropriate mood/affect, intact judgment & insight





- Neurologic


Neurologic: CNII-XII intact, moves all extremities





HEART Score





- HEART Score


EKG: Non-specific


Age: 45-65


Risk factors: > 3 risk factors or hx of atherosclerotic disease


Troponin: 


                                        











Troponin T  0.217 ng/mL (0.00-0.029)  H*  10/17/20  11:57    











Troponin: > 3x normal limit





- Critical Actions


Critical Actions: >7 pts:50-65% risk of adverse cardiac event. Early invasive 

measures





Results





- Labs


CBC & Chem 7: 


                                 10/17/20 06:32





                                 10/17/20 06:32


Labs: 


                              Abnormal lab results











  10/17/20 10/17/20 10/17/20 Range/Units





  06:32 06:32 09:27 


 


RDW  15.6 H    (13.2-15.2)  %


 


Mono % (Auto)  11.6 H    (0.0-7.3)  %


 


Eos % (Auto)  7.0 H    (0.0-4.3)  %


 


Mono # (Auto)  1.0 H    (0.0-0.8)  K/mm3


 


Eos # (Auto)  0.6 H    (0.0-0.4)  K/mm3


 


Chloride   92.6 L   ()  mmol/L


 


BUN   42 H   (7-17)  mg/dL


 


Creatinine   7.2 H   (0.6-1.2)  mg/dL


 


Troponin T   0.187 H*  0.195 H*  (0.00-0.029)  ng/mL














  10/17/20 Range/Units





  11:57 


 


RDW   (13.2-15.2)  %


 


Mono % (Auto)   (0.0-7.3)  %


 


Eos % (Auto)   (0.0-4.3)  %


 


Mono # (Auto)   (0.0-0.8)  K/mm3


 


Eos # (Auto)   (0.0-0.4)  K/mm3


 


Chloride   ()  mmol/L


 


BUN   (7-17)  mg/dL


 


Creatinine   (0.6-1.2)  mg/dL


 


Troponin T  0.217 H*  (0.00-0.029)  ng/mL














Assessment and Plan





- Patient Problems


(1) NSTEMI (non-ST elevated myocardial infarction)


Current Visit: Yes   Status: Acute   


Plan to address problem: 


Admit to telemetry, serial cardiac enzymes, EKG, remote telemetry monitoring, 

supplemental oxygen, nitro, aspirin, pulse oximetry, morphine for pain control, 

supportive care, further care as per cardiology team.  Left heart cath as per 

cardiology team.








(2) ESRD (end stage renal disease) on dialysis


Current Visit: Yes   Status: Chronic   


Plan to address problem: 


Strict I/O, monitor urine output every shift, daily weight, afterload reduction,

urgent dialysis as per renal team, avoid nephrotoxic agents.








(3) Acute diastolic (congestive) heart failure


Current Visit: No   Status: Acute   


Plan to address problem: 


Strict I's/O, monitor urine output every shift, daily weight, afterload 

reduction, cardiology team consulted in ED.  Further care management as per 

cardiology team








(4) Malignant hypertension


Current Visit: Yes   Status: Chronic   


Plan to address problem: 


Monitor blood pressure every shift, continue medical management, resume 

prehospital antihypertensive therapy.








(5) DVT prophylaxis


Current Visit: Yes   Status: Acute   


Plan to address problem: 


SCD to bilateral lower extremities while in bed, patient is ambulatory.

## 2020-10-17 NOTE — CAT SCAN REPORT
CT CHEST WITHOUT CONTRAST



INDICATION / CLINICAL INFORMATION:

pain.



TECHNIQUE:

Axial CT images were obtained through the chest without contrast. All CT scans at this location are p
erformed using CT dose reduction for ALARA by means of automated exposure control. 



COMPARISON:

None available.



FINDINGS:



Irregular enlarged thyroid with calcifications, ultrasound recommended for full evaluation



HEART: Cardiac enlargement

THORACIC AORTA: No significant abnormality. 

MEDIASTINUM and MIKE: No significant abnormality. Enlarged pulmonary arteries

LUNGS: Diffuse increased interstitium

PLEURA: No significant pleural effusion. No pneumothorax.



ADDITIONAL FINDINGS: None.



UPPER ABDOMEN: Shrunken kidneys



SKELETAL SYSTEM: No significant abnormality.



IMPRESSION:

1. Mild diffuse increased interstitium with associated cardiac enlargement left ventricular decompens
ation is a concern

2. Abnormal appearance of the thyroid gland recommend thyroid ultrasound for evaluation



Signer Name: Emery Ly MD 

Signed: 10/17/2020 3:05 PM

Workstation Name: [id number]

## 2020-10-17 NOTE — CONSULTATION
History of Present Illness


Consult date: 10/17/20


Requesting physician: BRANDO MALDONADO


Consult reason: chest pain


History of present illness: 


Pt is a 49 y.o. AA male with a hx of ESRD on HD (last session Thurs) who 

presented with complaints of left-sided chest pain radiating to her back x 3 

days prior to arrival. Pt states pain is constant and feels like a "heavy p

ressure." No associated cardiac complaints. No aggravating or relieving factors.

Trop noted to be elevated. ECG reveals no acute ischemic changes. Of note, pt 

had a negative Lexiscan MPI stress test 3/2020. 





HEART Score: 7





Past History


Past Medical History: diabetes, ESRD, hypertension, stroke


Social history: denies: smoking





Medications and Allergies


                                    Allergies











Allergy/AdvReac Type Severity Reaction Status Date / Time


 


diphenhydramine Allergy  Unknown Verified 08/05/19 02:48





[From Benadryl]     


 


lisinopril Allergy  Unknown Verified 08/05/19 02:48


 


seafood Allergy  Angioedema Uncoded 04/26/20 11:58











                                Home Medications











 Medication  Instructions  Recorded  Confirmed  Last Taken  Type


 


Aspirin EC [Ecotrin] 325 mg PO QDAY #30 tablet. 03/14/20 04/25/20 Unknown Rx


 


AtorvaSTATin [Lipitor] 20 mg PO QHS #30 tablet 03/14/20 04/25/20 Unknown Rx


 


Gabapentin 100 mg PO BID 03/14/20 04/25/20 03/08/20 History


 


Metoprolol [Lopressor TAB] 50 mg PO BID #60 tablet 03/14/20 04/25/20 Unknown Rx


 


traMADoL [Ultram 50 MG tab] 50 mg PO PRN 03/14/20 04/25/20 Unknown History


 


Cinacalcet HCl [Sensipar] 30 mg PO QDAY #30 04/26/20 04/25/20 Unknown Rx


 


Epoetin Jayy 10,000 Unit [Procrit] 10,000 unit IV SAVANNAH PRN #1 vial 04/26/20  

Unknown Rx


 


Famotidine [Pepcid] 20 mg PO QDAY #30 04/26/20 04/25/20 Unknown Rx


 


HYDROcodone/APAP 5-325 [Norco 1 tab PO Q6HR PRN #14 tablet 04/26/20 04/25/20 

Unknown Rx





5-325 mg TAB]     


 


cloNIDine 0.1 mg PO QDAY #30 04/26/20 04/25/20 03/08/20 Rx


 


methylPREDNISolone [Medrol 4MG 1 tab PO QDAY #1 tab.ds.pk 04/26/20  Unknown Rx





DOSEPAK (21 tabs)]     


 


oxyCODONE /ACETAMINOPHEN [Percocet 1 tab PO Q4H PRN #15 04/26/20 04/25/20 

Unknown Rx





5/325 mg]     














Review of Systems


Constitutional: fatigue, no fever, no chills, no sweats


Ears, nose, mouth and throat: no nasal congestion, no sore throat


Cardiovascular: chest pain, no orthopnea, no palpitations, no edema, no syncope,

no lightheadedness, no shortness of breath, no dyspnea on exertion, no 

paroxysmal nocturnal dyspnea, no claudication


Respiratory: no cough, no shortness of breath, no dyspnea on exertion


Gastrointestinal: no abdominal pain, no nausea, no vomiting, no diarrhea, no 

constipation


Genitourinary Female: no pelvic pain, no flank pain, no dysuria


Musculoskeletal: muscle cramps, no neck stiffness, no neck pain


Integumentary: no rash, no wounds


Neurological: no head injury, no paralysis, no weakness, no parathesias, no 

numbness, no tingling, no seizures, no syncope, no vertigo, no headaches


Endocrine: no cold intolerance, no heat intolerance, no polydipsia, no polyuria


Hematologic/Lymphatic: no easy bruising, no easy bleeding


Allergic/Immunologic: no urticaria





Physical Examination


Last Vital Signs











Temp  98.3 F   10/17/20 07:56


 


Pulse  80   10/17/20 11:54


 


Resp  12   10/17/20 11:54


 


BP  121/77   10/17/20 11:54


 


Pulse Ox  99   10/17/20 11:54








General appearance: no acute distress


HEENT: Positive: EOMI, Normocephaly, Mucus Membranes Moist


Neck: Positive: neck supple, trachea midline


Cardiac: Positive: Reg Rate and Rhythm, S1/S2


Lungs: Positive: clear to auscultation


Neuro: Positive: Grossly Intact


Abdomen: Positive: Soft, Active Bowel Sounds.  Negative: Tender


Skin: Negative: Rash


Musculoskeletal: No Pain


Extremities: Present: upper extr. pulses, lower extr. pulses.  Absent: edema





Results





                                 10/17/20 06:32





                                 10/17/20 06:32


                                 Cardiac Enzymes











  10/17/20 10/17/20 10/17/20 Range/Units





  06:32 06:32 09:27 


 


WBC  8.8    (4.5-11.0)  K/mm3


 


RBC  3.96    (3.65-5.03)  M/mm3


 


Hgb  12.3    (10.1-14.3)  gm/dl


 


Hct  36.6    (30.3-42.9)  %


 


MCV  92    (79-97)  fl


 


MCH  31    (28-32)  pg


 


MCHC  34    (30-34)  %


 


RDW  15.6 H    (13.2-15.2)  %


 


Plt Count  186    (140-440)  K/mm3


 


Lymph % (Auto)  20.1    (13.4-35.0)  %


 


Mono % (Auto)  11.6 H    (0.0-7.3)  %


 


Eos % (Auto)  7.0 H    (0.0-4.3)  %


 


Baso % (Auto)  0.9    (0.0-1.8)  %


 


Lymph # (Auto)  1.8    (1.2-5.4)  K/mm3


 


Mono # (Auto)  1.0 H    (0.0-0.8)  K/mm3


 


Eos # (Auto)  0.6 H    (0.0-0.4)  K/mm3


 


Baso # (Auto)  0.1    (0.0-0.1)  K/mm3


 


Seg Neutrophils %  60.4    (40.0-70.0)  %


 


Seg Neutrophils #  5.3    (1.8-7.7)  K/mm3


 


Sodium   141   (137-145)  mmol/L


 


Potassium   4.4   (3.6-5.0)  mmol/L


 


Chloride   92.6 L   ()  mmol/L


 


Carbon Dioxide   24   (22-30)  mmol/L


 


Anion Gap   29   mmol/L


 


BUN   42 H   (7-17)  mg/dL


 


Creatinine   7.2 H   (0.6-1.2)  mg/dL


 


Estimated GFR   7   ml/min


 


BUN/Creatinine Ratio   6   %


 


Glucose   87   ()  mg/dL


 


Calcium   10.2   (8.4-10.2)  mg/dL


 


Troponin T   0.187 H*  0.195 H*  (0.00-0.029)  ng/mL


 


Triglycerides   133   (2-149)  mg/dL


 


Cholesterol   161   ()  mg/dL


 


LDL Cholesterol Direct   94   ()  mg/dL


 


HDL Cholesterol   40   (40-59)  mg/dL


 


Cholesterol/HDL Ratio   4.02   %














  10/17/20 Range/Units





  11:57 


 


WBC   (4.5-11.0)  K/mm3


 


RBC   (3.65-5.03)  M/mm3


 


Hgb   (10.1-14.3)  gm/dl


 


Hct   (30.3-42.9)  %


 


MCV   (79-97)  fl


 


MCH   (28-32)  pg


 


MCHC   (30-34)  %


 


RDW   (13.2-15.2)  %


 


Plt Count   (140-440)  K/mm3


 


Lymph % (Auto)   (13.4-35.0)  %


 


Mono % (Auto)   (0.0-7.3)  %


 


Eos % (Auto)   (0.0-4.3)  %


 


Baso % (Auto)   (0.0-1.8)  %


 


Lymph # (Auto)   (1.2-5.4)  K/mm3


 


Mono # (Auto)   (0.0-0.8)  K/mm3


 


Eos # (Auto)   (0.0-0.4)  K/mm3


 


Baso # (Auto)   (0.0-0.1)  K/mm3


 


Seg Neutrophils %   (40.0-70.0)  %


 


Seg Neutrophils #   (1.8-7.7)  K/mm3


 


Sodium   (137-145)  mmol/L


 


Potassium   (3.6-5.0)  mmol/L


 


Chloride   ()  mmol/L


 


Carbon Dioxide   (22-30)  mmol/L


 


Anion Gap   mmol/L


 


BUN   (7-17)  mg/dL


 


Creatinine   (0.6-1.2)  mg/dL


 


Estimated GFR   ml/min


 


BUN/Creatinine Ratio   %


 


Glucose   ()  mg/dL


 


Calcium   (8.4-10.2)  mg/dL


 


Troponin T  0.217 H*  (0.00-0.029)  ng/mL


 


Triglycerides   (2-149)  mg/dL


 


Cholesterol   ()  mg/dL


 


LDL Cholesterol Direct   ()  mg/dL


 


HDL Cholesterol   (40-59)  mg/dL


 


Cholesterol/HDL Ratio   %








                                     Lipids











  10/17/20 Range/Units





  06:32 


 


Triglycerides  133  (2-149)  mg/dL


 


Cholesterol  161  ()  mg/dL


 


HDL Cholesterol  40  (40-59)  mg/dL


 


Cholesterol/HDL Ratio  4.02  %








                                       CBC











  10/17/20 Range/Units





  06:32 


 


WBC  8.8  (4.5-11.0)  K/mm3


 


RBC  3.96  (3.65-5.03)  M/mm3


 


Hgb  12.3  (10.1-14.3)  gm/dl


 


Hct  36.6  (30.3-42.9)  %


 


Plt Count  186  (140-440)  K/mm3


 


Lymph # (Auto)  1.8  (1.2-5.4)  K/mm3


 


Mono # (Auto)  1.0 H  (0.0-0.8)  K/mm3


 


Eos # (Auto)  0.6 H  (0.0-0.4)  K/mm3


 


Baso # (Auto)  0.1  (0.0-0.1)  K/mm3








                          Comprehensive Metabolic Panel











  10/17/20 Range/Units





  06:32 


 


Sodium  141  (137-145)  mmol/L


 


Potassium  4.4  (3.6-5.0)  mmol/L


 


Chloride  92.6 L  ()  mmol/L


 


Carbon Dioxide  24  (22-30)  mmol/L


 


BUN  42 H  (7-17)  mg/dL


 


Creatinine  7.2 H  (0.6-1.2)  mg/dL


 


Glucose  87  ()  mg/dL


 


Calcium  10.2  (8.4-10.2)  mg/dL














- Imaging and Cardiology


Echo: report reviewed (3/12/2020 - mild dilation of aortic root; LA/RA severely 

dilated; mod MR; mod TR; mild-mod AR; mod concentric LVH; EF 60-65%; RV mild-mod

dilated)


EKG: report reviewed, image reviewed





- EKG Interpretation


EKG: no acute changes





EKG interpretations





- EKG


Sinus rhythms and dysrhythmias: sinus rhythm





Assessment and Plan


Trend Diandra. 


Tele monitoring. 


Will consider LHC on Monday. 


Resume home cardiac regimen when reconciled. 





Pt seen in conjunction with Dr. Chavez, who agrees with the assessment and plan

of care.





- Patient Problems


(1) NSTEMI (non-ST elevated myocardial infarction)


Current Visit: Yes   Status: Acute   





(2) ESRD (end stage renal disease) on dialysis


Current Visit: Yes   Status: Chronic   





(3) HTN (hypertension)


Current Visit: Yes   Status: Chronic   


Qualifiers: 


   Hypertension type: essential hypertension   Qualified Code(s): I10 - 

Essential (primary) hypertension   





(4) DM2 (diabetes mellitus, type 2)


Current Visit: Yes   Status: Chronic   





(5) History of CVA (cerebrovascular accident)


Current Visit: Yes   Status: Chronic

## 2020-10-18 LAB
BUN SERPL-MCNC: 53 MG/DL (ref 7–17)
BUN/CREAT SERPL: 6 %
CALCIUM SERPL-MCNC: 10.1 MG/DL (ref 8.4–10.2)
HEMOLYSIS INDEX: 5

## 2020-10-18 RX ADMIN — Medication SCH ML: at 10:41

## 2020-10-18 RX ADMIN — MORPHINE SULFATE PRN MG: 2 INJECTION, SOLUTION INTRAMUSCULAR; INTRAVENOUS at 10:40

## 2020-10-18 RX ADMIN — Medication SCH ML: at 22:36

## 2020-10-18 NOTE — CONSULTATION
History of Present Illness





- Reason for Consult


Consult date: 10/18/20


end stage renal disease


Requesting physician: ANTOINE JEFFERS





- History of Present Illness





History of present illness: 


50 YO Female with HTN,HLD, GERD,  ESRD on HD (T,R,Sa), DM, Diastolic CHF present

to ED for evaluation.  Patient states that she was in her usual state of health 

at bedtime around 2100 hrs.  Patient states that she awoke from sleep this 

morning at approximately 0830 hrs. and experienced a sudden onset pain in her 

chest.  Patient states the pain is 7/10, constant, localized to the left chest, 

crushing in nature, nonradiating, not worsened with exertion, not relieved with 

rest.  Patient transported to [facility] via private vehicle for further care and evalu

ation of the aforementioned symptoms.  Patient seen and evaluated in the 

emergency department.  All lab and imaging studies reviewed.  Patient found to 

have non-ST elevation MI, diastolic congestive heart failure, as well as end-

stage renal disease in need of dialysis.  Echocardiogram and stress test from 

March 2020 reviewed.  Cardiology team consulted in ED.  Patient admitted to 

telemetry for further care and evaluation due to increased risk of cardiac 

decompensation.  Nephrology team consulted in ED.  Further care as per 

cardiology team.  Patient denies fever, chills, chest pain, shortness of breath,

productive cough, recent ill contacts, unilateral leg swelling, calf pain, 

prolonged travel/immobility, individual/family history of DVT/PE/bleeding/blood 

clot disorders, or known exposure to COVID-19.  All medication listed at time of

admission has been reconciled.  Prior admission on 4/25/2020 reviewed.





Past History


Past Medical History: diabetes, ESRD, hypertension, stroke


Past Surgical History: bowel surgery, Other (Dialysis access)


Social history: single.  denies: smoking


Family history: hypertension





Review of Systems


Constitutional: no weight loss, no weight gain, no sweats


Ears, nose, mouth and throat: no ear pain, no tinnitis, no nose pain, no nasal 

discharge, no sinus pressure


Breasts: no change in shape, no swelling, no mass


Cardiovascular: chest pain, no orthopnea, no palpitations, no rapid/irregular 

heart beat, no syncope, no paroxysmal nocturnal dyspnea, no high blood pressure


Respiratory: no cough, no cough with sputum, no hemoptysis, no shortness of 

breath


Gastrointestinal: no abdominal pain, no nausea, no vomiting, no diarrhea, no 

change in bowel habits


Genitourinary Female: no pelvic pain, no flank pain, no dysuria, no urinary 

frequency, no urgency


Rectal: no pain, no incontinence, no bleeding


Musculoskeletal: no neck stiffness, no neck pain, no shooting arm pain, no arm 

numbness/tingling, no low back pain, no shooting leg pain


Integumentary: no rash, no pruritis, no redness, no sores, no wounds


Neurological: no paralysis, no parathesias, no numbness, no seizures, no tremors


Psychiatric: no anxiety, no change in sleep habits, no sleep disturbances, no 

hypersomnia, no change in appetite, no change in libido, no suicidal ideation


Endocrine: no cold intolerance, no polydipsia, no nocturia, no flushing


Hematologic/Lymphatic: no easy bruising, no easy bleeding


Allergic/Immunologic: no urticaria, no allergic rhinitis











Past History


Past Medical History: diabetes, ESRD, hypertension, stroke


Past Surgical History: bowel surgery, Other (Dialysis access)


Social history: single.  denies: smoking


Family history: hypertension





Medications and Allergies


                                    Allergies











Allergy/AdvReac Type Severity Reaction Status Date / Time


 


diphenhydramine Allergy  Unknown Verified 08/05/19 02:48





[From Benadryl]     


 


lisinopril Allergy  Unknown Verified 08/05/19 02:48


 


seafood Allergy  Angioedema Uncoded 04/26/20 11:58











                                Home Medications











 Medication  Instructions  Recorded  Confirmed  Last Taken  Type


 


Aspirin EC [Ecotrin] 325 mg PO QDAY #30 tablet. 03/14/20 10/17/20 Unknown Rx


 


AtorvaSTATin [Lipitor] 20 mg PO QHS #30 tablet 03/14/20 10/17/20 Unknown Rx


 


Gabapentin 100 mg PO BID 03/14/20 10/17/20 03/08/20 History


 


Metoprolol [Lopressor TAB] 50 mg PO BID #60 tablet 03/14/20 10/17/20 Unknown Rx


 


traMADoL [Ultram 50 MG tab] 50 mg PO PRN 03/14/20 10/17/20 Unknown History


 


Cinacalcet HCl [Sensipar] 30 mg PO QDAY #30 04/26/20 10/17/20 Unknown Rx


 


Epoetin Jayy 10,000 Unit [Procrit] 10,000 unit IV SAVANNAH PRN #1 vial 04/26/20 

10/17/20 Unknown Rx


 


Famotidine [Pepcid] 20 mg PO QDAY #30 04/26/20 10/17/20 Unknown Rx


 


HYDROcodone/APAP 5-325 [Norco 1 tab PO Q6HR PRN #14 tablet 04/26/20 10/17/20 

Unknown Rx





5-325 mg TAB]     


 


cloNIDine 0.1 mg PO QDAY #30 04/26/20 10/17/20 03/08/20 Rx


 


methylPREDNISolone [Medrol 4MG 1 tab PO QDAY #1 tab.ds.pk 04/26/20 10/17/20 

Unknown Rx





DOSEPAK (21 tabs)]     


 


oxyCODONE /ACETAMINOPHEN [Percocet 1 tab PO Q4H PRN #15 04/26/20 10/17/20 

Unknown Rx





5/325 mg]     











Active Meds: 


Active Medications





Acetaminophen (Tylenol)  650 mg PO Q4H PRN


   PRN Reason: Pain MILD(1-3)/Fever >100.5/HA


Albuterol (Proventil)  2.5 mg IH Q4HRT PRN


   PRN Reason: Shortness Of Breath


Morphine Sulfate (Morphine)  2 mg IV Q4H PRN


   PRN Reason: Pain, Moderate (4-6)


   Last Admin: 10/18/20 10:40 Dose:  2 mg


   Documented by: 


Ondansetron HCl (Zofran)  4 mg IV Q8H PRN


   PRN Reason: Nausea And Vomiting


Sodium Chloride (Sodium Chloride Flush Syringe 10 Ml)  10 ml IV BID THU


   Last Admin: 10/18/20 10:41 Dose:  10 ml


   Documented by: 


Sodium Chloride (Sodium Chloride Flush Syringe 10 Ml)  10 ml IV PRN PRN


   PRN Reason: LINE FLUSH











Exam





- Vital Signs


Vital signs: 


                                   Vital Signs











Temp Pulse Resp BP Pulse Ox


 


 98.5 F   101 H  18   172/109   94 


 


 10/17/20 05:51  10/17/20 05:51  10/17/20 05:51  10/17/20 05:51  10/17/20 05:51














- Physical Exam


Narrative exam: 





General appearance: Present: mild distress





- EENT


Eyes: Present: PERRL


ENT: hearing intact, clear oral mucosa





- Neck


Neck: Present: supple, normal ROM





- Respiratory


Respiratory effort: normal


Respiratory: bilateral: CTA





- Cardiovascular


Heart Sounds: Present: S1 & S2.  Absent: rub, click





- Extremities


Extremities: pulses symmetrical, No edema


Peripheral Pulses: within normal limits





- Abdominal


General gastrointestinal: Present: soft, non-tender, non-distended, normal bowel

sounds


Female genitourinary: Present: normal





- Integumentary


Integumentary: Present: clear, warm, dry





- Musculoskeletal


Musculoskeletal: gait normal, strength equal bilaterally





- Psychiatric


Psychiatric: appropriate mood/affect, intact judgment & insight





- Neurologic


Neurologic: CNII-XII intact, moves all extremities





Results





- Lab Results





                                 10/17/20 06:32





                                 10/18/20 04:37


                             Most recent lab results











Calcium  10.1 mg/dL (8.4-10.2)   10/18/20  04:37    














Assessment and Plan





Impression:


* ESRD


* Chest pain


* HTN


* DM type 2


* CVA





Plan:


* resume HD in am and q tthsat


* uf as tolerated


* cardiac workup per cards


* resume home meds


* renal diet and strict i/os


* compliance with dialysis regimen

## 2020-10-18 NOTE — PROGRESS NOTE
Assessment and Plan


Assessment and plan: 





NSTEMI (non-ST elevated myocardial infarction)


Continue telemetry monitoring, serial cardiac enzymes, EKG,  nitro, aspirin, 

pulse oximetry, morphine for pain control, supportive care, further care as per 

cardiology team.  Left heart cath as per cardiology team.





ESRD (end stage renal disease) on dialysis


Strict I/O, monitor urine output every shift, daily weight, afterload reduction,

urgent dialysis as per renal team, avoid nephrotoxic agents.





Acute diastolic (congestive) heart failure


Strict I's/O, monitor urine output every shift, daily weight, afterload 

reduction, cardiology team consulted in ED.  Further care management as per 

cardiology team





Malignant hypertension


Monitor blood pressure every shift, continue medical management, resume home 

antihypertensive therapy.





DVT prophylaxis


SCD to bilateral lower extremities while in bed, patient is ambulatory.








History


Interval history: 





No new issues overnight.





Hospitalist Physical





- Constitutional


Vitals: 


                                        











Temp Pulse Resp BP Pulse Ox


 


 98.0 F   74   18   141/82   98 


 


 10/18/20 07:57  10/18/20 07:57  10/18/20 07:57  10/18/20 07:57  10/18/20 07:57











General appearance: Present: no acute distress





- EENT


Eyes: Present: PERRL, EOM intact


ENT: hearing intact, clear oral mucosa, dentition normal





- Neck


Neck: Present: supple, normal ROM





- Respiratory


Respiratory effort: normal


Respiratory: bilateral: CTA





- Cardiovascular


Rhythm: regular


Heart Sounds: Present: S1 & S2.  Absent: gallop, rub





- Extremities


Extremities: no ischemia, No edema, Full ROM





- Abdominal


General gastrointestinal: soft, non-tender, non-distended, normal bowel sounds





- Integumentary


Integumentary: Present: clear, warm, dry





- Neurologic


Neurologic: CNII-XII intact, moves all extremities





HEART Score





- HEART Score


EKG: Non-specific


Age: 45-65


Risk factors: > 3 risk factors or hx of atherosclerotic disease


Troponin: 


                                        











Troponin T  0.217 ng/mL (0.00-0.029)  H*  10/17/20  11:57    











Troponin: > 3x normal limit





- Critical Actions


Critical Actions: >7 pts:50-65% risk of adverse cardiac event. Early invasive 

measures





Results





- Labs


CBC & Chem 7: 


                                 10/17/20 06:32





                                 10/18/20 04:37


Labs: 


                             Laboratory Last Values











WBC  8.8 K/mm3 (4.5-11.0)   10/17/20  06:32    


 


RBC  3.96 M/mm3 (3.65-5.03)   10/17/20  06:32    


 


Hgb  12.3 gm/dl (10.1-14.3)   10/17/20  06:32    


 


Hct  36.6 % (30.3-42.9)   10/17/20  06:32    


 


MCV  92 fl (79-97)   10/17/20  06:32    


 


MCH  31 pg (28-32)   10/17/20  06:32    


 


MCHC  34 % (30-34)   10/17/20  06:32    


 


RDW  15.6 % (13.2-15.2)  H  10/17/20  06:32    


 


Plt Count  186 K/mm3 (140-440)   10/17/20  06:32    


 


Lymph % (Auto)  20.1 % (13.4-35.0)   10/17/20  06:32    


 


Mono % (Auto)  11.6 % (0.0-7.3)  H  10/17/20  06:32    


 


Eos % (Auto)  7.0 % (0.0-4.3)  H  10/17/20  06:32    


 


Baso % (Auto)  0.9 % (0.0-1.8)   10/17/20  06:32    


 


Lymph # (Auto)  1.8 K/mm3 (1.2-5.4)   10/17/20  06:32    


 


Mono # (Auto)  1.0 K/mm3 (0.0-0.8)  H  10/17/20  06:32    


 


Eos # (Auto)  0.6 K/mm3 (0.0-0.4)  H  10/17/20  06:32    


 


Baso # (Auto)  0.1 K/mm3 (0.0-0.1)   10/17/20  06:32    


 


Seg Neutrophils %  60.4 % (40.0-70.0)   10/17/20  06:32    


 


Seg Neutrophils #  5.3 K/mm3 (1.8-7.7)   10/17/20  06:32    


 


Sodium  139 mmol/L (137-145)   10/18/20  04:37    


 


Potassium  4.9 mmol/L (3.6-5.0)   10/18/20  04:37    


 


Chloride  87.3 mmol/L ()  L  10/18/20  04:37    


 


Carbon Dioxide  24 mmol/L (22-30)   10/18/20  04:37    


 


Anion Gap  33 mmol/L  10/18/20  04:37    


 


BUN  53 mg/dL (7-17)  H  10/18/20  04:37    


 


Creatinine  8.6 mg/dL (0.6-1.2)  H  10/18/20  04:37    


 


Estimated GFR  6 ml/min  10/18/20  04:37    


 


BUN/Creatinine Ratio  6 %  10/18/20  04:37    


 


Glucose  72 mg/dL ()   10/18/20  04:37    


 


POC Glucose  96  ()   10/17/20  21:54    


 


Calcium  10.1 mg/dL (8.4-10.2)   10/18/20  04:37    


 


Troponin T  0.217 ng/mL (0.00-0.029)  H*  10/17/20  11:57    


 


Triglycerides  133 mg/dL (2-149)   10/17/20  06:32    


 


Cholesterol  161 mg/dL ()   10/17/20  06:32    


 


LDL Cholesterol Direct  94 mg/dL ()   10/17/20  06:32    


 


HDL Cholesterol  40 mg/dL (40-59)   10/17/20  06:32    


 


Cholesterol/HDL Ratio  4.02 %  10/17/20  06:32    











[place]/IV: 


                                        





IV Catheter Type [Left Upper     INT / Saline Lock


arm]                             











Active Medications





- Current Medications


Current Medications: 














Generic Name Dose Route Start Last Admin





  Trade Name Freq  PRN Reason Stop Dose Admin


 


Acetaminophen  650 mg  10/17/20 14:23 





  Tylenol  PO  





  Q4H PRN  





  Pain MILD(1-3)/Fever >100.5/HA  


 


Albuterol  2.5 mg  10/17/20 14:23 





  Proventil  IH  





  Q4HRT PRN  





  Shortness Of Breath  


 


Morphine Sulfate  2 mg  10/17/20 18:10  10/17/20 20:25





  Morphine  IV   2 mg





  Q4H PRN   Administration





  Pain, Moderate (4-6)  


 


Ondansetron HCl  4 mg  10/17/20 14:23 





  Zofran  IV  





  Q8H PRN  





  Nausea And Vomiting  


 


Sodium Chloride  10 ml  10/17/20 22:00  10/17/20 23:30





  Sodium Chloride Flush Syringe 10 Ml  IV   Not Given





  BID THU  


 


Sodium Chloride  10 ml  10/17/20 14:23 





  Sodium Chloride Flush Syringe 10 Ml  IV  





  PRN PRN  





  LINE FLUSH

## 2020-10-18 NOTE — PROGRESS NOTE
Assessment and Plan


Suspect NSTEMI Type 2 in the setting of ESRD. Neg Lexiscan MPI stress test 

3/2020. Pt refuses LHC at this time.  


Cont tele monitoring. 


Resume PO Lopressor at reduced dosage. Will increase as BP permits. 


Resume bASA and statin.  


Cont other present mgmt. 





Pt seen in conjunction with Dr. Chavez, who agrees with the assessment and plan

of care.





- Patient Problems


(1) NSTEMI (non-ST elevated myocardial infarction)


Current Visit: Yes   Status: Acute   





(2) ESRD (end stage renal disease) on dialysis


Current Visit: Yes   Status: Chronic   





(3) HTN (hypertension)


Current Visit: Yes   Status: Chronic   


Qualifiers: 


   Hypertension type: essential hypertension   Qualified Code(s): I10 - 

Essential (primary) hypertension   





(4) DM2 (diabetes mellitus, type 2)


Current Visit: Yes   Status: Chronic   





(5) History of CVA (cerebrovascular accident)


Current Visit: Yes   Status: Chronic   





Subjective


Date of service: 10/18/20


Principal diagnosis: ESRD/HD, Elevated Trop


Interval history: 


Pt resting comfortably in bed upon exam. C/o productive cough and intermittent 

episodes of "pulling" chest pain, worse w/cough. Pt also reports what she 

believes is a pinched nerve in her R neck/shoulder area. Sharp pain on the R 

side of her body noted with certain movements. No additional cardiac complaints.

Tele reviewed - SR w/no acute events noted.





Objective


Last Vital Signs











Temp  99.5 F   10/18/20 17:01


 


Pulse  89   10/18/20 17:01


 


Resp  18   10/18/20 17:01


 


BP  151/86   10/18/20 17:01


 


Pulse Ox  93   10/18/20 17:01











- Physical Examination


General: No Apparent Distress


HEENT: Positive: EOMI, Normocephaly, Mucus Membranes Moist


Neck: Positive: neck supple, trachea midline.  Negative: JVD/HJR


Cardiac: Positive: Reg Rate and Rhythm, S1/S2


Lungs: Positive: Decreased Breath Sounds


Neuro: Positive: Grossly Intact


Abdomen: Positive: Soft, Active Bowel Sounds.  Negative: Tender


Skin: Negative: Rash


Musculoskeletal: No Pain


Extremities: Present: upper extr. pulses, lower extr. pulses.  Absent: edema





- Labs and Meds


                          Comprehensive Metabolic Panel











  10/18/20 Range/Units





  04:37 


 


Sodium  139  (137-145)  mmol/L


 


Potassium  4.9  (3.6-5.0)  mmol/L


 


Chloride  87.3 L  ()  mmol/L


 


Carbon Dioxide  24  (22-30)  mmol/L


 


BUN  53 H  (7-17)  mg/dL


 


Creatinine  8.6 H  (0.6-1.2)  mg/dL


 


Glucose  72  ()  mg/dL


 


Calcium  10.1  (8.4-10.2)  mg/dL














- Imaging and Cardiology


EKG: report reviewed, image reviewed


Echo: report reviewed (3/12/2020 - mild dilation of aortic root; LA/RA severely 

dilated; mod MR; mod TR; mild-mod AR; mod concentric LVH; EF 60-65%; RV mild-mod

dilated)





- Telemetry


EKG Rhythm: Sinus Rhythm





- EKG


Sinus rhythms and dysrhythmias: sinus rhythm

## 2020-10-19 VITALS — SYSTOLIC BLOOD PRESSURE: 154 MMHG | DIASTOLIC BLOOD PRESSURE: 88 MMHG

## 2020-10-19 RX ADMIN — MORPHINE SULFATE PRN MG: 2 INJECTION, SOLUTION INTRAMUSCULAR; INTRAVENOUS at 10:26

## 2020-10-19 RX ADMIN — Medication SCH ML: at 10:28

## 2020-10-19 NOTE — DISCHARGE SUMMARY
Providers





- Providers


Date of Admission: 


10/17/20 14:35





Date of discharge: 10/19/20


Attending physician: 


AMY J KOCHERLA





                                        





10/17/20 13:33


Consult to Physician [CONS] Stat 


   Comment: 


   Consulting Provider: JYOTHI STEWART


   Physician Instructions: 


   Reason For Exam: CHEST PAIN





10/18/20 00:22


Consult to Physician [CONS] Routine 


   Comment: 


   Consulting Provider: ELIAS HERNANDEZ


   Physician Instructions: 


   Reason For Exam: dialysis











Primary care physician: 


PRIMARY CARE MD








Hospitalization


Condition: Stable


Disposition: DC-01 TO HOME OR SELFCARE


Time spent for discharge: 32 min





Core Measure Documentation





- Palliative Care


Palliative Care/ Comfort Measures: Not Applicable





- Core Measures


Any of the following diagnoses?: none





Exam





- Constitutional


Vitals: 


                                        











Temp Pulse Resp BP Pulse Ox


 


 97.7 F   77   20   141/83   100 


 


 10/19/20 11:20  10/19/20 11:20  10/19/20 11:20  10/19/20 11:20  10/19/20 11:20











General appearance: Present: no acute distress, well-nourished





- EENT


Eyes: Present: PERRL, EOM intact





- Neck


Neck: Present: supple, normal ROM





- Respiratory


Respiratory effort: normal


Respiratory: bilateral: diminished, negative: rales, rhonchi, wheezing





- Cardiovascular


Rhythm: regular


Heart Sounds: Present: S1 & S2





- Extremities


Extremities: no ischemia, No edema





- Abdominal


General gastrointestinal: Present: soft, non-tender, non-distended, normal bowel

 sounds





- Integumentary


Integumentary: Present: clear, warm





- Musculoskeletal


Musculoskeletal: strength equal bilaterally





- Psychiatric


Psychiatric: appropriate mood/affect, cooperative





- Neurologic


Neurologic: moves all extremities





Plan


Diet: renal


Additional Instructions: Follow renal/hemodialysis per schedule TTS.  Follow 

cardiologist per schedule as given below.  follow-up [facility] /

 office within 1-2 weeks ([phone]).  Strongly advised to comply with 

medications, diet, follow-up visits and hemodialysis.  Verbalized understanding.

  If you have worsening symptoms contact MD or go to emergency room as needed


Follow up with: 


PRIMARY CARE,MD [Primary Care Provider] - 3-5 Days


JYOTHI STEWART MD [Staff Physician] - 14 Days


Prescriptions: 


Aspirin [Aspirin BABY CHEW TAB] 81 mg PO QDAY #30 tab.chew


ISOSORBIDE MONOnitrate [Imdur ER] 30 mg PO QDAY #30 tablet


AtorvaSTATin [Lipitor] 20 mg PO QHS #30 tablet


Metoprolol Xl [Metoprolol SUCCINATE ER TAB] 25 mg PO QDAY #30 tablet


oxyCODONE /ACETAMINOPHEN [Percocet 5/325] 1 tab PO TID PRN #10 tablet


 PRN Reason: Pain , Severe (7-10)


Clopidogrel [Plavix] 75 mg PO QDAY #30 tablet

## 2020-10-19 NOTE — PROGRESS NOTE
Assessment and Plan


Assessment and plan: 


--NSTEMI (non-ST elevated myocardial infarction) type II[in the setting of end-

stage renal disease]


Probably secondary to NSTEMI type II due to end-stage renal disease , however 

patient has multiple risk factors 


continue current cardiac medications, nitro, aspirin, pulse oximetry, morphine 

for pain control, supportive care, 


Cardiology following , patient had negative stress test March 2020 


Cardiology recommend left heart catheterization however patient refused .


Continue current cardiac medications 





--ESRD (end stage renal disease) on dialysis


Hemodialysis per schedule, nephrology following


Avoid nephrotoxins





--Acute diastolic (congestive) heart failure


Strict I's/O, monitor urine output every shift, daily weight, afterload red

uction, 


cardiology following, continue antifailure medications per protocol 





--Malignant hypertension


Blood pressures well controlled now


Continue current antihypertensives and as needed medications





--DVT prophylaxis


SCD Heparin renal dose .








Hospitalist Physical





- Constitutional


Vitals: 


                                        











Temp Pulse Resp BP Pulse Ox


 


 98.5 F   76   20   141/79   96 


 


 10/19/20 07:20  10/19/20 07:20  10/19/20 07:20  10/19/20 07:20  10/19/20 08:15











General appearance: Present: no acute distress





HEART Score





- HEART Score


EKG: Non-specific


Age: 45-65


Risk factors: > 3 risk factors or hx of atherosclerotic disease


Troponin: 


                                        











Troponin T  0.217 ng/mL (0.00-0.029)  H*  10/17/20  11:57    











Troponin: > 3x normal limit





- Critical Actions


Critical Actions: >7 pts:50-65% risk of adverse cardiac event. Early invasive 

measures





Results





- Labs


CBC & Chem 7: 


                                 10/17/20 06:32





                                 10/18/20 04:37


Labs: 


                             Laboratory Last Values











WBC  8.8 K/mm3 (4.5-11.0)   10/17/20  06:32    


 


RBC  3.96 M/mm3 (3.65-5.03)   10/17/20  06:32    


 


Hgb  12.3 gm/dl (10.1-14.3)   10/17/20  06:32    


 


Hct  36.6 % (30.3-42.9)   10/17/20  06:32    


 


MCV  92 fl (79-97)   10/17/20  06:32    


 


MCH  31 pg (28-32)   10/17/20  06:32    


 


MCHC  34 % (30-34)   10/17/20  06:32    


 


RDW  15.6 % (13.2-15.2)  H  10/17/20  06:32    


 


Plt Count  186 K/mm3 (140-440)   10/17/20  06:32    


 


Lymph % (Auto)  20.1 % (13.4-35.0)   10/17/20  06:32    


 


Mono % (Auto)  11.6 % (0.0-7.3)  H  10/17/20  06:32    


 


Eos % (Auto)  7.0 % (0.0-4.3)  H  10/17/20  06:32    


 


Baso % (Auto)  0.9 % (0.0-1.8)   10/17/20  06:32    


 


Lymph # (Auto)  1.8 K/mm3 (1.2-5.4)   10/17/20  06:32    


 


Mono # (Auto)  1.0 K/mm3 (0.0-0.8)  H  10/17/20  06:32    


 


Eos # (Auto)  0.6 K/mm3 (0.0-0.4)  H  10/17/20  06:32    


 


Baso # (Auto)  0.1 K/mm3 (0.0-0.1)   10/17/20  06:32    


 


Seg Neutrophils %  60.4 % (40.0-70.0)   10/17/20  06:32    


 


Seg Neutrophils #  5.3 K/mm3 (1.8-7.7)   10/17/20  06:32    


 


Sodium  139 mmol/L (137-145)   10/18/20  04:37    


 


Potassium  4.9 mmol/L (3.6-5.0)   10/18/20  04:37    


 


Chloride  87.3 mmol/L ()  L  10/18/20  04:37    


 


Carbon Dioxide  24 mmol/L (22-30)   10/18/20  04:37    


 


Anion Gap  33 mmol/L  10/18/20  04:37    


 


BUN  53 mg/dL (7-17)  H  10/18/20  04:37    


 


Creatinine  8.6 mg/dL (0.6-1.2)  H  10/18/20  04:37    


 


Estimated GFR  6 ml/min  10/18/20  04:37    


 


BUN/Creatinine Ratio  6 %  10/18/20  04:37    


 


Glucose  72 mg/dL ()   10/18/20  04:37    


 


POC Glucose  96  ()   10/17/20  21:54    


 


Calcium  10.1 mg/dL (8.4-10.2)   10/18/20  04:37    


 


Troponin T  0.217 ng/mL (0.00-0.029)  H*  10/17/20  11:57    


 


Triglycerides  133 mg/dL (2-149)   10/17/20  06:32    


 


Cholesterol  161 mg/dL ()   10/17/20  06:32    


 


LDL Cholesterol Direct  94 mg/dL ()   10/17/20  06:32    


 


HDL Cholesterol  40 mg/dL (40-59)   10/17/20  06:32    


 


Cholesterol/HDL Ratio  4.02 %  10/17/20  06:32    











[place]/IV: 


                                        





Voiding Method                   Toilet


IV Catheter Type [Left Upper     INT / Saline Lock


arm]                             











Active Medications





- Current Medications


Current Medications: 














Generic Name Dose Route Start Last Admin





  Trade Name Freq  PRN Reason Stop Dose Admin


 


Acetaminophen  650 mg  10/17/20 14:23  10/18/20 17:47





  Tylenol  PO   650 mg





  Q4H PRN   Administration





  Pain MILD(1-3)/Fever >100.5/HA  


 


Albuterol  2.5 mg  10/17/20 14:23 





  Proventil  IH  





  Q4HRT PRN  





  Shortness Of Breath  


 


Aspirin  81 mg  10/19/20 10:00 





  Baby Aspirin  PO  





  QDAY [facility]  


 


Atorvastatin Calcium  20 mg  10/18/20 22:00  10/18/20 22:34





  Lipitor  PO   20 mg





  QHS THU   Administration


 


Clopidogrel Bisulfate  75 mg  10/19/20 10:00 





  Plavix  PO  





  QDAY [facility]  


 


Isosorbide Mononitrate  30 mg  10/19/20 10:00 





  Imdur  PO  





  QDAY [facility]  


 


Metoprolol Succinate  25 mg  10/19/20 10:00 





  Metoprolol Xl  PO  





  QDAY [facility]  


 


Morphine Sulfate  2 mg  10/17/20 18:10  10/18/20 10:40





  Morphine  IV   2 mg





  Q4H PRN   Administration





  Pain, Moderate (4-6)  


 


Ondansetron HCl  4 mg  10/17/20 14:23 





  Zofran  IV  





  Q8H PRN  





  Nausea And Vomiting  


 


Sodium Chloride  10 ml  10/17/20 22:00  10/18/20 22:36





  Sodium Chloride Flush Syringe 10 Ml  IV   10 ml





  BID THU   Administration


 


Sodium Chloride  10 ml  10/17/20 14:23 





  Sodium Chloride Flush Syringe 10 Ml  IV  





  PRN PRN  





  LINE FLUSH

## 2020-10-19 NOTE — PROGRESS NOTE
Assessment and Plan


Pt has declined [facility] at this time. Neg Lexiscan MPI stress test 3/2020. 


Continue bASA and statin. Will also add Plavix. 


Switch PO Lopressor to Toprol. 


Will also add Imdur. 





Otherwise stable cardiac status. Pt may be discharged from a Cardiology 

perspective. 


Recommend follow-up in our office within 1-2 weeks ([phone]). 





Pt seen in conjunction with Dr. Rosado, who agrees with the assessment and plan

of care.





- Patient Problems


(1) NSTEMI (non-ST elevated myocardial infarction)


Current Visit: Yes   Status: Acute   





(2) ESRD on hemodialysis


Current Visit: Yes   Status: Chronic   





(3) HTN (hypertension)


Current Visit: Yes   Status: Chronic   


Qualifiers: 


   Hypertension type: essential hypertension   Qualified Code(s): I10 - 

Essential (primary) hypertension   





(4) DM2 (diabetes mellitus, type 2)


Current Visit: Yes   Status: Chronic   





(5) History of CVA (cerebrovascular accident)


Current Visit: Yes   Status: Chronic   





(6) Medical non-compliance


Current Visit: Yes   Status: Chronic   





Subjective


Date of service: 10/19/20


Principal diagnosis: ESRD/HD, NSTEMI 2


Interval history: 


Pt resting comfortably in bed upon exam. Feeling better today. No cardiac 

complaints this AM. Tele reviewed - SR w/no acute events noted.





Objective


Last Vital Signs











Temp  97.7 F   10/19/20 11:20


 


Pulse  77   10/19/20 11:20


 


Resp  20   10/19/20 11:20


 


BP  141/83   10/19/20 11:20


 


Pulse Ox  100   10/19/20 11:20











- Physical Examination


General: No Apparent Distress


HEENT: Positive: EOMI, Normocephaly, Mucus Membranes Moist


Neck: Positive: neck supple, trachea midline.  Negative: JVD/HJR


Cardiac: Positive: Reg Rate and Rhythm, S1/S2


Lungs: Positive: clear to auscultation


Neuro: Positive: Grossly Intact


Abdomen: Positive: Soft, Active Bowel Sounds.  Negative: Tender


Skin: Negative: Rash


Musculoskeletal: No Pain


Extremities: Present: upper extr. pulses, lower extr. pulses.  Absent: edema





- Imaging and Cardiology


EKG: report reviewed, image reviewed


Echo: report reviewed (3/12/2020 - mild dilation of aortic root; LA/RA severely 

dilated; mod MR; mod TR; mild-mod AR; mod concentric LVH; EF 60-65%; RV mild-mod

dilated)





- Telemetry


EKG Rhythm: Sinus Rhythm





- EKG


Sinus rhythms and dysrhythmias: sinus rhythm

## 2020-10-19 NOTE — PROGRESS NOTE
Assessment and Plan


Impression:


* ESRD on HD


* Chest pain, NSTEMI II. patient declined left heart cath.


* HTN


* diabetes mellitus type 2


* H/o CVA


* hyperlipidemia





Plan:


* Hemodialysis per TTS schedule


* Reviewed cardiology recommendations


* Renally dose mediation for creatinine clearance less than 15 cc/min


* Renal diet


* continue antihypertensives


* hold on HD days for systolics less than 160


* continue statin














Subjective


Date of service: 10/19/20


Principal diagnosis: ESRD/HD, NSTEMI 2


Interval history: 


Patient was seen for her renal issues


Nursing, interdisciplinary and consult notes were reviewed


Vitals, input and output, medications and labs were reviewed


Denies chest pain, tolerating diet








Objective





- Exam


Narrative Exam: 


Vitals: Reviewed


General: No acute distress


HEENT: Oral mucosa moist, no pharyngeal erythema, no evidence of epistaxis


Neck: Supple, no evidence of any JVD, trachea midline, no thyromegaly


Chest: Clear to auscultation, no crackles, rales or wheezes


Heart: Regular rate and rhythm, S1-S2 heard, no S3-S4, no pericardial rub


Abdomen: Soft, nontender, no renal bruit, no suprapubic masses no CVA tenderness


Extremity: No peripheral cyanosis, edema and dry skin


Neurological: Alert, awake, no asterixis


Dermatology; no skin rashes 


Back: Nontender thoracolumbar spine, no CVA tenderness


Psych: No agitation and aggression


Musculoskeletal: No joint effusion noted











- Vital Signs


Vital signs: 


                               Vital Signs - 12hr











  10/19/20 10/19/20 10/19/20





  07:20 08:15 09:00


 


Temperature 98.5 F  


 


Pulse Rate 76  77


 


Respiratory 20  





Rate   


 


Blood Pressure 141/79  


 


O2 Sat by Pulse 95 96 





Oximetry   














  10/19/20 10/19/20 10/19/20





  11:20 15:53 17:00


 


Temperature 97.7 F 98.0 F 


 


Pulse Rate 77 88 77


 


Respiratory 20 20 





Rate   


 


Blood Pressure 141/83 154/88 


 


O2 Sat by Pulse 100 98 





Oximetry   














- Lab





                                 10/17/20 06:32





                                 10/18/20 04:37


                             Most recent lab results











Calcium  10.1 mg/dL (8.4-10.2)   10/18/20  04:37    














Medications & Allergies





- Medications


Allergies/Adverse Reactions: 


                                    Allergies





diphenhydramine [From Benadryl] Allergy (Verified 08/05/19 02:48)


   Unknown


lisinopril Allergy (Verified 08/05/19 02:48)


   Unknown


seafood Allergy (Uncoded 04/26/20 11:58)


   Angioedema








Home Medications: 


                                Home Medications











 Medication  Instructions  Recorded  Confirmed  Last Taken  Type


 


Gabapentin 100 mg PO BID 03/14/20 10/17/20 03/08/20 History


 


traMADoL [Ultram 50 MG tab] 50 mg PO PRN 03/14/20 10/17/20 Unknown History


 


Cinacalcet HCl [Sensipar] 30 mg PO QDAY #30 04/26/20 10/17/20 Unknown Rx


 


Epoetin Jayy 10,000 Unit [Procrit] 10,000 unit IV SAVANNAH PRN #1 vial 04/26/20 

10/17/20 Unknown Rx


 


Famotidine [Pepcid] 20 mg PO QDAY #30 04/26/20 10/17/20 Unknown Rx


 


cloNIDine 0.1 mg PO QDAY #30 04/26/20 10/17/20 03/08/20 Rx


 


methylPREDNISolone [Medrol 4MG 1 tab PO QDAY #1 tab.ds.pk 04/26/20 10/17/20 

Unknown Rx





DOSEPAK (21 tabs)]     


 


oxyCODONE /ACETAMINOPHEN [Percocet 1 tab PO Q4H PRN #15 04/26/20 10/17/20 

Unknown Rx





5/325 mg]     


 


Aspirin [Aspirin BABY CHEW TAB] 81 mg PO QDAY #30 tab.chew 10/19/20  Unknown Rx


 


AtorvaSTATin [Lipitor] 20 mg PO QHS #30 tablet 10/19/20  Unknown Rx


 


Clopidogrel [Plavix] 75 mg PO QDAY #30 tablet 10/19/20  Unknown Rx


 


ISOSORBIDE MONOnitrate [Imdur ER] 30 mg PO QDAY #30 tablet 10/19/20  Unknown Rx


 


Metoprolol Xl [Metoprolol 25 mg PO QDAY #30 tablet 10/19/20  Unknown Rx





SUCCINATE ER TAB]     


 


oxyCODONE /ACETAMINOPHEN [Percocet 1 tab PO TID PRN #10 tablet 10/19/20  Unknown

 Rx





5/325]     











Active Medications: 














Generic Name Dose Route Start Last Admin





  Trade Name Freq  PRN Reason Stop Dose Admin


 


Acetaminophen  650 mg  10/17/20 14:23  10/18/20 17:47





  Tylenol  PO   650 mg





  Q4H PRN   Administration





  Pain MILD(1-3)/Fever >100.5/HA  


 


Albuterol  2.5 mg  10/17/20 14:23 





  Proventil  IH  





  Q4HRT PRN  





  Shortness Of Breath  


 


Aspirin  81 mg  10/19/20 10:00  10/19/20 10:20





  Baby Aspirin  PO   81 mg





  QDAY THU   Administration


 


Atorvastatin Calcium  20 mg  10/18/20 22:00  10/18/20 22:34





  Lipitor  PO   20 mg





  QHS THU   Administration


 


Clopidogrel Bisulfate  75 mg  10/19/20 10:00  10/19/20 10:20





  Plavix  PO   75 mg





  QDAY THU   Administration


 


Isosorbide Mononitrate  30 mg  10/19/20 10:00  10/19/20 10:20





  Imdur  PO   30 mg





  QDAY THU   Administration


 


Metoprolol Succinate  25 mg  10/19/20 10:00  10/19/20 10:20





  Metoprolol Xl  PO   25 mg





  QDAY THU   Administration


 


Morphine Sulfate  2 mg  10/17/20 18:10  10/19/20 10:26





  Morphine  IV   2 mg





  Q4H PRN   Administration





  Pain, Moderate (4-6)  


 


Ondansetron HCl  4 mg  10/17/20 14:23 





  Zofran  IV  





  Q8H PRN  





  Nausea And Vomiting  


 


Sodium Chloride  10 ml  10/17/20 22:00  10/19/20 10:28





  Sodium Chloride Flush Syringe 10 Ml  IV   10 ml





  BID THU   Administration


 


Sodium Chloride  10 ml  10/17/20 14:23 





  Sodium Chloride Flush Syringe 10 Ml  IV  





  PRN PRN  





  LINE FLUSH

## 2020-12-15 ENCOUNTER — OUT OF OFFICE VISIT (OUTPATIENT)
Dept: URBAN - METROPOLITAN AREA MEDICAL CENTER 12 | Facility: MEDICAL CENTER | Age: 50
End: 2020-12-15
Payer: COMMERCIAL

## 2020-12-15 DIAGNOSIS — R11.2 ACUTE NAUSEA WITH NONBILIOUS VOMITING: ICD-10-CM

## 2020-12-15 DIAGNOSIS — K21.00 ALKALINE REFLUX ESOPHAGITIS: ICD-10-CM

## 2020-12-15 DIAGNOSIS — K22.2 ACQUIRED ESOPHAGEAL RING: ICD-10-CM

## 2020-12-15 DIAGNOSIS — R13.19 CERVICAL DYSPHAGIA: ICD-10-CM

## 2020-12-15 DIAGNOSIS — K92.1 BLACK STOOL: ICD-10-CM

## 2020-12-15 PROCEDURE — 99232 SBSQ HOSP IP/OBS MODERATE 35: CPT | Performed by: INTERNAL MEDICINE

## 2020-12-15 PROCEDURE — G8427 DOCREV CUR MEDS BY ELIG CLIN: HCPCS | Performed by: INTERNAL MEDICINE

## 2020-12-15 PROCEDURE — 99222 1ST HOSP IP/OBS MODERATE 55: CPT | Performed by: INTERNAL MEDICINE

## 2020-12-16 ENCOUNTER — OUT OF OFFICE VISIT (OUTPATIENT)
Dept: URBAN - METROPOLITAN AREA MEDICAL CENTER 12 | Facility: MEDICAL CENTER | Age: 50
End: 2020-12-16
Payer: COMMERCIAL

## 2020-12-16 DIAGNOSIS — K92.1 BLACK STOOL: ICD-10-CM

## 2020-12-16 DIAGNOSIS — R11.2 ACUTE NAUSEA WITH NONBILIOUS VOMITING: ICD-10-CM

## 2020-12-16 DIAGNOSIS — K21.00 ALKALINE REFLUX ESOPHAGITIS: ICD-10-CM

## 2020-12-16 DIAGNOSIS — D62 ACUTE BLOOD LOSS ANEMIA: ICD-10-CM

## 2020-12-16 DIAGNOSIS — K22.2 ACQUIRED ESOPHAGEAL RING: ICD-10-CM

## 2020-12-16 PROCEDURE — 99233 SBSQ HOSP IP/OBS HIGH 50: CPT | Performed by: INTERNAL MEDICINE

## 2020-12-16 PROCEDURE — 43239 EGD BIOPSY SINGLE/MULTIPLE: CPT | Performed by: INTERNAL MEDICINE

## 2020-12-16 PROCEDURE — 99232 SBSQ HOSP IP/OBS MODERATE 35: CPT | Performed by: INTERNAL MEDICINE

## 2020-12-16 PROCEDURE — 43249 ESOPH EGD DILATION <30 MM: CPT | Performed by: INTERNAL MEDICINE

## 2021-01-10 ENCOUNTER — DASHBOARD ENCOUNTERS (OUTPATIENT)
Age: 51
End: 2021-01-10

## 2021-01-11 ENCOUNTER — OFFICE VISIT (OUTPATIENT)
Dept: URBAN - METROPOLITAN AREA CLINIC 92 | Facility: CLINIC | Age: 51
End: 2021-01-11

## 2021-01-11 RX ORDER — SODIUM, POTASSIUM,MAG SULFATES 17.5-3.13G
354 ML SOLUTION, RECONSTITUTED, ORAL ORAL
Qty: 354 ML | Refills: 0 | OUTPATIENT
Start: 2021-01-10 | End: 2021-01-11

## 2021-01-11 NOTE — HPI-TODAY'S VISIT:
Asked by  ___ to see patient for melenea   dark black stool   Denies abd pain N/V diarrhea constipation hematochezia jaundice unintentional wt loss   Denies dyspepsia htbrn dysphagia odynophagia food impaction CP cough anorexia light headedness   Denies scleral icterus, increased abd girth, LE edema, confusion, disorientation, memory loss, hematemesis  NSAID usage  EtOH / Tob  Fam Hx GI cancer  Prior EGD 2y ago - gastritis, hpylori negative

## 2021-01-28 ENCOUNTER — TELEPHONE ENCOUNTER (OUTPATIENT)
Dept: URBAN - METROPOLITAN AREA CLINIC 92 | Facility: CLINIC | Age: 51
End: 2021-01-28

## 2021-02-27 ENCOUNTER — HOSPITAL ENCOUNTER (INPATIENT)
Dept: HOSPITAL 5 - ED | Age: 51
LOS: 9 days | Discharge: HOME | DRG: 871 | End: 2021-03-08
Attending: INTERNAL MEDICINE | Admitting: INTERNAL MEDICINE
Payer: MEDICAID

## 2021-02-27 DIAGNOSIS — D64.9: ICD-10-CM

## 2021-02-27 DIAGNOSIS — R65.20: ICD-10-CM

## 2021-02-27 DIAGNOSIS — Z91.013: ICD-10-CM

## 2021-02-27 DIAGNOSIS — Z88.8: ICD-10-CM

## 2021-02-27 DIAGNOSIS — J96.01: ICD-10-CM

## 2021-02-27 DIAGNOSIS — I16.0: ICD-10-CM

## 2021-02-27 DIAGNOSIS — E87.70: ICD-10-CM

## 2021-02-27 DIAGNOSIS — Z79.82: ICD-10-CM

## 2021-02-27 DIAGNOSIS — Z86.73: ICD-10-CM

## 2021-02-27 DIAGNOSIS — E87.5: ICD-10-CM

## 2021-02-27 DIAGNOSIS — Z79.899: ICD-10-CM

## 2021-02-27 DIAGNOSIS — L03.90: ICD-10-CM

## 2021-02-27 DIAGNOSIS — I47.1: ICD-10-CM

## 2021-02-27 DIAGNOSIS — Z79.01: ICD-10-CM

## 2021-02-27 DIAGNOSIS — I21.A1: ICD-10-CM

## 2021-02-27 DIAGNOSIS — G93.41: ICD-10-CM

## 2021-02-27 DIAGNOSIS — N18.6: ICD-10-CM

## 2021-02-27 DIAGNOSIS — Z20.822: ICD-10-CM

## 2021-02-27 DIAGNOSIS — A41.9: Primary | ICD-10-CM

## 2021-02-27 DIAGNOSIS — J18.9: ICD-10-CM

## 2021-02-27 LAB
ALBUMIN SERPL-MCNC: 3 G/DL (ref 3.9–5)
ALT SERPL-CCNC: < 5 UNITS/L (ref 7–56)
BASOPHILS # (AUTO): 0.1 K/MM3 (ref 0–0.1)
BASOPHILS NFR BLD AUTO: 1.3 % (ref 0–1.8)
BILIRUB UR QL STRIP: (no result)
BLOOD UR QL VISUAL: (no result)
BUN SERPL-MCNC: 81 MG/DL (ref 7–17)
BUN/CREAT SERPL: 6 %
CALCIUM SERPL-MCNC: 10.2 MG/DL (ref 8.4–10.2)
CRP SERPL-MCNC: 3.9 MG/DL (ref 0–1.3)
EOSINOPHIL # BLD AUTO: 0.7 K/MM3 (ref 0–0.4)
EOSINOPHIL NFR BLD AUTO: 9.8 % (ref 0–4.3)
HCT VFR BLD CALC: 34.4 % (ref 30.3–42.9)
HDLC SERPL-MCNC: 34 MG/DL (ref 40–59)
HEMOLYSIS INDEX: 12
HGB BLD-MCNC: 10.8 GM/DL (ref 10.1–14.3)
LYMPHOCYTES # BLD AUTO: 2 K/MM3 (ref 1.2–5.4)
LYMPHOCYTES NFR BLD AUTO: 28.6 % (ref 13.4–35)
MCHC RBC AUTO-ENTMCNC: 32 % (ref 30–34)
MCV RBC AUTO: 92 FL (ref 79–97)
MONOCYTES # (AUTO): 0.8 K/MM3 (ref 0–0.8)
MONOCYTES % (AUTO): 11.4 % (ref 0–7.3)
PH UR STRIP: 7 [PH] (ref 5–7)
PLATELET # BLD: 244 K/MM3 (ref 140–440)
RBC # BLD AUTO: 3.76 M/MM3 (ref 3.65–5.03)
RBC #/AREA URNS HPF: < 1 /HPF (ref 0–6)
UROBILINOGEN UR-MCNC: < 2 MG/DL (ref ?–2)
WBC #/AREA URNS HPF: 1 /HPF (ref 0–6)

## 2021-02-27 PROCEDURE — 80061 LIPID PANEL: CPT

## 2021-02-27 PROCEDURE — 84439 ASSAY OF FREE THYROXINE: CPT

## 2021-02-27 PROCEDURE — 83615 LACTATE (LD) (LDH) ENZYME: CPT

## 2021-02-27 PROCEDURE — 85379 FIBRIN DEGRADATION QUANT: CPT

## 2021-02-27 PROCEDURE — 36415 COLL VENOUS BLD VENIPUNCTURE: CPT

## 2021-02-27 PROCEDURE — 82947 ASSAY GLUCOSE BLOOD QUANT: CPT

## 2021-02-27 PROCEDURE — U0003 INFECTIOUS AGENT DETECTION BY NUCLEIC ACID (DNA OR RNA); SEVERE ACUTE RESPIRATORY SYNDROME CORONAVIRUS 2 (SARS-COV-2) (CORONAVIRUS DISEASE [COVID-19]), AMPLIFIED PROBE TECHNIQUE, MAKING USE OF HIGH THROUGHPUT TECHNOLOGIES AS DESCRIBED BY CMS-2020-01-R: HCPCS

## 2021-02-27 PROCEDURE — 85007 BL SMEAR W/DIFF WBC COUNT: CPT

## 2021-02-27 PROCEDURE — 84443 ASSAY THYROID STIM HORMONE: CPT

## 2021-02-27 PROCEDURE — 80053 COMPREHEN METABOLIC PANEL: CPT

## 2021-02-27 PROCEDURE — 82962 GLUCOSE BLOOD TEST: CPT

## 2021-02-27 PROCEDURE — 96368 THER/DIAG CONCURRENT INF: CPT

## 2021-02-27 PROCEDURE — 80202 ASSAY OF VANCOMYCIN: CPT

## 2021-02-27 PROCEDURE — 81001 URINALYSIS AUTO W/SCOPE: CPT

## 2021-02-27 PROCEDURE — 80074 ACUTE HEPATITIS PANEL: CPT

## 2021-02-27 PROCEDURE — 96375 TX/PRO/DX INJ NEW DRUG ADDON: CPT

## 2021-02-27 PROCEDURE — 82140 ASSAY OF AMMONIA: CPT

## 2021-02-27 PROCEDURE — 82553 CREATINE MB FRACTION: CPT

## 2021-02-27 PROCEDURE — 70486 CT MAXILLOFACIAL W/O DYE: CPT

## 2021-02-27 PROCEDURE — 84145 PROCALCITONIN (PCT): CPT

## 2021-02-27 PROCEDURE — 86140 C-REACTIVE PROTEIN: CPT

## 2021-02-27 PROCEDURE — 93306 TTE W/DOPPLER COMPLETE: CPT

## 2021-02-27 PROCEDURE — 85610 PROTHROMBIN TIME: CPT

## 2021-02-27 PROCEDURE — 82728 ASSAY OF FERRITIN: CPT

## 2021-02-27 PROCEDURE — 84100 ASSAY OF PHOSPHORUS: CPT

## 2021-02-27 PROCEDURE — 85025 COMPLETE CBC W/AUTO DIFF WBC: CPT

## 2021-02-27 PROCEDURE — 84484 ASSAY OF TROPONIN QUANT: CPT

## 2021-02-27 PROCEDURE — P9047 ALBUMIN (HUMAN), 25%, 50ML: HCPCS

## 2021-02-27 PROCEDURE — 87086 URINE CULTURE/COLONY COUNT: CPT

## 2021-02-27 PROCEDURE — 82550 ASSAY OF CK (CPK): CPT

## 2021-02-27 PROCEDURE — 87493 C DIFF AMPLIFIED PROBE: CPT

## 2021-02-27 PROCEDURE — 78580 LUNG PERFUSION IMAGING: CPT

## 2021-02-27 PROCEDURE — 80048 BASIC METABOLIC PNL TOTAL CA: CPT

## 2021-02-27 PROCEDURE — 96365 THER/PROPH/DIAG IV INF INIT: CPT

## 2021-02-27 PROCEDURE — 96366 THER/PROPH/DIAG IV INF ADDON: CPT

## 2021-02-27 PROCEDURE — A9540 TC99M MAA: HCPCS

## 2021-02-27 PROCEDURE — 71045 X-RAY EXAM CHEST 1 VIEW: CPT

## 2021-02-27 PROCEDURE — 70450 CT HEAD/BRAIN W/O DYE: CPT

## 2021-02-27 PROCEDURE — 87040 BLOOD CULTURE FOR BACTERIA: CPT

## 2021-02-27 PROCEDURE — 93005 ELECTROCARDIOGRAM TRACING: CPT

## 2021-02-27 PROCEDURE — 84132 ASSAY OF SERUM POTASSIUM: CPT

## 2021-02-27 PROCEDURE — 83735 ASSAY OF MAGNESIUM: CPT

## 2021-02-27 RX ADMIN — METOPROLOL TARTRATE SCH MG: 5 INJECTION INTRAVENOUS at 19:20

## 2021-02-27 RX ADMIN — METOPROLOL TARTRATE SCH MG: 5 INJECTION INTRAVENOUS at 19:15

## 2021-02-27 NOTE — CAT SCAN REPORT
CT MAXILLOFACIAL WITHOUT CONTRAST



INDICATION:

Facial swelling.



TECHNIQUE:

All CT scans at this location are performed using CT dose reduction for ALARA by means of automated e
xposure control. 



COMPARISON:

None available.



FINDINGS:

FACIAL BONES: There is diffuse sclerosis throughout the facial bones which could be seen in the setti
ng of renal osteodystrophy. Accounting for motion artifact, no displaced fracture is seen.

PARANASAL SINUSES: There is mucosal thickening in the right frontal sinus and within the right maxill
parish sinus. No air-fluid levels are identified.

ORBITS: No significant abnormality.



VISUALIZED INTRACRANIAL STRUCTURES: No significant abnormality.  



ADDITIONAL FINDINGS: There is significant subcutaneous soft tissue edema/swelling in the right perior
bital region.



IMPRESSION:

1.   Significant right preseptal periorbital soft tissue swelling. No underlying globe or intraorbita
l abnormality. This could be due to trauma or inflammation. No acute skeletal abnormality accounting 
for motion artifact.









CT HEAD WITHOUT CONTRAST



INDICATION:

altered mental status hx of HTN CVA.



TECHNIQUE:

All CT scans at this location are performed using CT dose reduction for ALARA by means of automated e
xposure control. 



COMPARISON:

None available.



FINDINGS:

HEMORRHAGE: None.

EXTRA-AXIAL SPACES: Normal in size and morphology for the patient's age.

VENTRICULAR SYSTEM: Normal in size and morphology for the patient's age.

BRAIN PARENCHYMA: No acute findings. Extensive deep periventricular white matter hypodensities are no
gail, likely due to small vessel ischemic disease. There are also chronic bilateral basal ganglia lacu
yessenia infarcts.

MIDLINE SHIFT OR HERNIATION: None.





SOFT TISSUES OF HEAD: Normal.

CALVARIUM: There is diffuse osteosclerosis which could be seen in the setting of renal osteodystrophy
. No acute skeletal abnormality.



ADDITIONAL FINDINGS: None.



IMPRESSION:

1. No acute intracranial abnormality.



Signer Name: Dillon Martinez MD 

Signed: 2/27/2021 9:24 PM

Workstation Name: [id number]

## 2021-02-27 NOTE — HISTORY AND PHYSICAL REPORT
History of Present Illness


Date of examination: 02/27/21


Date of admission: 


02/27/21 21:59





Chief complaint: 





Altered Mental Status


History of present illness: 





50-year-old -American female with known history of end-stage renal 

disease on dialysis, diabetes mellitus, CVA and history of SVT presenting to the

emergency room today via EMS with altered mental status.  Upon arrival in the 

emergency room patient was found to be in SVT and also had a fever.  Temperature

was said to be about 102 F.  Patient received 6 mg of adenosine and 

subsequently 12 mg of adenosine in route to the hospital.  Subsequent rhythm was

said to have been a sinus tachycardia.  She had an initial heart rate of about 

200 which later improved to about 140.





Most of the history was gotten from the ER staff as patient is still confused.  

Patient lives with daughter and boyfriend.





Daughter was said to have noticed that patient had some soft stools facial 

swelling earlier today and was lying down on the right side.


It is guarded the patient has missed 2 sessions of dialysis.  She was last 

dialyzed on Tuesday.  She is gets dialysis on Tuesdays, Thursdays and Saturdays 

at [facility] dialysis center.





Nephrologist is Dr. Boogie





Patient's PCP is Dr. Ted Contreras








Work-up in the emergency room today reveals hyperkalemia of 6.2.  Urinalysis was

unremarkable.


CT scan of the head shows no acute findings.


Chest x-ray reveals:Mild diffuse reticular markings may be due at least in part 

to low lung volumes. Interstitial 


 process such as lower airways disease could have this appearance. No pneumonia 

is identified. 





Patient is being admitted with altered mental status, SVT, hyperkalemia and 

fever.








Past History


Past Medical History: diabetes, dialysis, ESRD, hypertension, stroke


Past Surgical History: Other (Left arm A-V fistula, Gastric Bypass.)


Social history: no significant social history


Family history: no significant family history





Medications and Allergies


                                    Allergies











Allergy/AdvReac Type Severity Reaction Status Date / Time


 


diphenhydramine Allergy  Unknown Verified 08/05/19 02:48





[From Benadryl]     


 


lisinopril Allergy  Unknown Verified 08/05/19 02:48


 


seafood Allergy  Angioedema Uncoded 04/26/20 11:58











                                Home Medications











 Medication  Instructions  Recorded  Confirmed  Last Taken  Type


 


Gabapentin 100 mg PO BID 03/14/20 10/17/20 03/08/20 History


 


traMADoL [Ultram 50 MG tab] 50 mg PO PRN 03/14/20 10/17/20 Unknown History


 


Cinacalcet HCl [Sensipar] 30 mg PO QDAY #30 04/26/20 10/17/20 Unknown Rx


 


Epoetin Jayy 10,000 Unit [Procrit] 10,000 unit IV SAVANNAH PRN #1 vial 04/26/20 

10/17/20 Unknown Rx


 


Famotidine [Pepcid] 20 mg PO QDAY #30 04/26/20 10/17/20 Unknown Rx


 


cloNIDine 0.1 mg PO QDAY #30 04/26/20 10/17/20 03/08/20 Rx


 


methylPREDNISolone [Medrol 4MG 1 tab PO QDAY #1 tab.ds.pk 04/26/20 10/17/20 

Unknown Rx





DOSEPAK (21 tabs)]     


 


oxyCODONE /ACETAMINOPHEN [Percocet 1 tab PO Q4H PRN #15 04/26/20 10/17/20 

Unknown Rx





5/325 mg]     


 


Aspirin [Aspirin BABY CHEW TAB] 81 mg PO QDAY #30 tab.chew 10/19/20  Unknown Rx


 


AtorvaSTATin [Lipitor] 20 mg PO QHS #30 tablet 10/19/20  Unknown Rx


 


Clopidogrel [Plavix] 75 mg PO QDAY #30 tablet 10/19/20  Unknown Rx


 


ISOSORBIDE MONOnitrate [Imdur ER] 30 mg PO QDAY #30 tablet 10/19/20  Unknown Rx


 


Metoprolol Xl [Metoprolol 25 mg PO QDAY #30 tablet 10/19/20  Unknown Rx





SUCCINATE ER TAB]     


 


oxyCODONE /ACETAMINOPHEN [Percocet 1 tab PO TID PRN #10 tablet 10/19/20  Unknown

 Rx





5/325]     











Active Meds: 


Active Medications





Acetaminophen (Acetaminophen 325 Mg Tab)  650 mg PO Q4H PRN


   PRN Reason: Pain MILD(1-3)/Fever >100.5/HA


Dextrose (Dextrose 50% In Water (25gm) 50 Ml Syringe)  50 ml IV Q30MIN PRN; 

Protocol


   PRN Reason: Hypoglycemia


Cefepime HCl (Cefepime/Ns 2 Gm/100 Ml)  2 gm in 100 mls @ 200 mls/hr IV Q8H THU;

Protocol


Insulin Human Lispro (Insulin Lispro 100 Unit/Ml)  0 unit SUB-Q ACHS THU; 

Protocol


Insulin Human Lispro (Insulin Lispro 100 Unit/Ml)  0 unit SUB-Q ACHS THU; P

rotocol


Magnesium Hydroxide (Magnesium Hydroxide (Mom) Oral Liqd Udc)  30 ml PO Q4H PRN


   PRN Reason: Constipation


Metoprolol Tartrate (Metoprolol Tartrate 5 Mg/5 Ml Inj)  5 mg IV Q5MIN THU


   Stop: 03/01/21 19:13


   Last Admin: 02/27/21 19:20 Dose:  5 mg


   Documented by: 


Morphine Sulfate (Morphine 2 Mg/1 Ml Inj)  2 mg IV Q4H PRN


   PRN Reason: Pain, Moderate (4-6)


Ondansetron HCl (Ondansetron 4 Mg/2 Ml Inj)  4 mg IV Q8H PRN


   PRN Reason: Nausea And Vomiting


Sodium Chloride (Sodium Chloride 0.9% 10 Ml Flush Syringe)  10 ml IV BID THU


Sodium Chloride (Sodium Chloride 0.9% 10 Ml Flush Syringe)  10 ml IV PRN PRN


   PRN Reason: LINE FLUSH











Review of Systems


ROS unobtainable: due to mental status





Exam





- Constitutional


Vitals: 


                                        











Temp Pulse Resp BP Pulse Ox


 


 101.2 F H  103 H  13   109/71   96 


 


 02/27/21 19:00  02/27/21 21:45  02/27/21 21:45  02/27/21 21:45  02/27/21 21:45











General appearance: Present: no acute distress, well-nourished





- EENT


Eyes: Present: PERRL, EOM intact.  Absent: scleral icterus


ENT: hearing intact, clear oral mucosa, dentition normal





- Neck


Neck: Present: supple, normal ROM





- Respiratory


Respiratory effort: normal


Respiratory: bilateral: CTA





- Cardiovascular


Rhythm: regular


Heart Sounds: Present: S1 & S2.  Absent: gallop, systolic murmur, diastolic 

murmur, rub, click





- Extremities


Extremities: no ischemia, pulses intact, pulses symmetrical, No edema, normal 

temperature, normal color, Full ROM


Peripheral Pulses: within normal limits





- Abdominal


General gastrointestinal: Present: soft, non-tender, non-distended, normal bowel

sounds.  Absent: mass





- Integumentary


Integumentary: Present: clear, warm, dry.  Absent: rash





- Musculoskeletal


Musculoskeletal: strength equal bilaterally





- Psychiatric


Psychiatric: appropriate mood/affect, intact judgment & insight, memory intact, 

cooperative





- Neurologic


Neurologic: no focal deficits, moves all extremities, other (Alert,Confused.)





- Additional findings


Additional findings: 





Left arm A-V fistula.





HEART Score





- HEART Score


Troponin: 


                                        











Troponin T  0.587 ng/mL (0.00-0.029)  H*  02/27/21  19:51    














Results





- Labs


CBC & Chem 7: 


                                 02/27/21 19:51





                                 02/27/21 19:51


Labs: 


                              Abnormal lab results











  02/27/21 02/27/21 02/27/21 Range/Units





  19:51 19:51 19:51 


 


RDW  16.9 H    (13.2-15.2)  %


 


Mono % (Auto)  11.4 H    (0.0-7.3)  %


 


Eos % (Auto)  9.8 H    (0.0-4.3)  %


 


Eos # (Auto)  0.7 H    (0.0-0.4)  K/mm3


 


D-Dimer   2202.99 H   (0-234)  ng/mlDDU


 


Potassium    6.2 H*  (3.6-5.0)  mmol/L


 


Chloride    97.5 L  ()  mmol/L


 


Carbon Dioxide    21 L  (22-30)  mmol/L


 


BUN    81 H  (7-17)  mg/dL


 


Creatinine    14.7 H  (0.6-1.2)  mg/dL


 


Glucose    58 L  ()  mg/dL


 


Lactic Acid     (0.7-2.0)  mmol/L


 


Ferritin     (10.0-200.0)  ng/mL


 


ALT    < 5 L  (7-56)  units/L


 


Alkaline Phosphatase    500 H  ()  units/L


 


Lactate Dehydrogenase     ()  units/L


 


Troponin T    0.587 H*  (0.00-0.029)  ng/mL


 


C-Reactive Protein     (0.00-1.30)  mg/dL


 


Albumin    3.0 L  (3.9-5)  g/dL


 


Triglycerides    236 H  (2-149)  mg/dL


 


Cholesterol    202 H  ()  mg/dL


 


HDL Cholesterol    34 L  (40-59)  mg/dL














  02/27/21 02/27/21 02/27/21 Range/Units





  19:51 19:51 19:51 


 


RDW     (13.2-15.2)  %


 


Mono % (Auto)     (0.0-7.3)  %


 


Eos % (Auto)     (0.0-4.3)  %


 


Eos # (Auto)     (0.0-0.4)  K/mm3


 


D-Dimer     (0-234)  ng/mlDDU


 


Potassium     (3.6-5.0)  mmol/L


 


Chloride     ()  mmol/L


 


Carbon Dioxide     (22-30)  mmol/L


 


BUN     (7-17)  mg/dL


 


Creatinine     (0.6-1.2)  mg/dL


 


Glucose   59 L   ()  mg/dL


 


Lactic Acid  2.70 H*    (0.7-2.0)  mmol/L


 


Ferritin    1434.0 H  (10.0-200.0)  ng/mL


 


ALT     (7-56)  units/L


 


Alkaline Phosphatase     ()  units/L


 


Lactate Dehydrogenase   213 H   ()  units/L


 


Troponin T     (0.00-0.029)  ng/mL


 


C-Reactive Protein   3.90 H   (0.00-1.30)  mg/dL


 


Albumin     (3.9-5)  g/dL


 


Triglycerides     (2-149)  mg/dL


 


Cholesterol     ()  mg/dL


 


HDL Cholesterol     (40-59)  mg/dL














Assessment and Plan





- Patient Problems


(1) Acute metabolic encephalopathy


Current Visit: Yes   Status: Acute   


Plan to address problem: 


Etiology is unclear however will monitor mental status.


Patient has missed 2 sessions of dialysis.  She has also been running fever.








(2) Hyperkalemia


Current Visit: Yes   Status: Acute   


Plan to address problem: 


Patient has received insulin and glucose, calcium gluconate and sodium bicarb.


We will monitor potassium level.


Nephrologist has been consulted by the ER physician.








(3) Missed dialysis


Current Visit: Yes   Status: Acute   


Plan to address problem: 


Compliance will be encouraged.  Consult placed to nephrology for dialysis.








(4) SVT (supraventricular tachycardia)


Current Visit: Yes   Status: Acute   


Plan to address problem: 


Patient will be closely monitored on telemetry.  She has received 2 rounds of 

adenosine.


We will place consult to cardiology for evaluation.








(5) Suspected COVID-19 virus infection


Current Visit: Yes   Status: Acute   


Plan to address problem: 


Patient will be placed on isolation precautions.  We will place consult to 

infectious disease for evaluation.








(6) ESRD on hemodialysis


Current Visit: Yes   Status: Chronic   


Plan to address problem: 


Patient gets dialysis on Tuesdays, Thursdays and Saturdays.








(7) DM2 (diabetes mellitus, type 2)


Current Visit: No   Status: Chronic   


Plan to address problem: 


We will monitor Accu-Cheks.








(8) HTN (hypertension)


Current Visit: No   Status: Chronic   


Qualifiers: 


   Hypertension type: essential hypertension   Qualified Code(s): I10 - 

Essential (primary) hypertension   





(9) Hyperkalemia


Current Visit: No   Status: Resolved   





(10) DVT prophylaxis


Current Visit: No   Status: Acute   


Plan to address problem: 


Patient placed on subcutaneous heparin.








(11) Full code status


Current Visit: Yes   Status: Acute   


Plan to address problem: 


Patient is a full code.

## 2021-02-27 NOTE — XRAY REPORT
CHEST 1 VIEW



INDICATION: 

fever.



COMPARISON: 

10/17/2020



FINDINGS:



Support devices: None.

Heart: Enlarged, unchanged. 

Lungs/Pleura: There are mild diffuse reticular markings. No focal consolidation, significant effusion
, or pneumothorax.  







IMPRESSION:

1. Mild diffuse reticular markings may be due at least in part to low lung volumes. Interstitial proc
ess such as lower airways disease could have this appearance. No pneumonia is identified.



Signer Name: Dillon Martinez MD 

Signed: 2/27/2021 9:02 PM

Workstation Name: [id number]

## 2021-02-27 NOTE — EMERGENCY DEPARTMENT REPORT
ED Fever HPI





- General


Chief Complaint: Arrhythmia/Palpitations


Stated Complaint: AMS/SVT


PUI?: Yes


Time Seen by Provider: 02/27/21 18:59


Source: patient, EMS notes reviewed


Exam Limitations: other (altered mental status)





- History of Present Illness


Initial Comments: 





Chief complaint fever altered mental status SVT





HPI: This is a 50-year-old female with history of SVT, hypertension, end-stage 

renal disease, diabetes mellitus, CVA, encephalopathy who presents with altered 

mental status.  Upon arrival EMS noticed patient's cardiac rhythm SVT.  Also n

oticed fever temporal temperature 102.  Patient received 6 mg and 12 mg doses 

adenosine in route.  Heart rate decreased to 140 beats a minute.  Subsequent 

rhythm appeared to be sinus tachycardia.  Heart rate then returned to 200 bpm.  

Patient answers only questions.  Patient is agitated.  Daughter informed EMS 

that this is her baseline mental status.  Patient was transported to dialysis 

today according to daughter's report.  I spoke with mother per phone.  She was 

unable to give me any information.  However she did inform me that patient lives

with her daughter and boyfriend. 





Patient is bedbound.





I was able to speak with Can extensively per phone.  She has a caregiver 

Monday through Friday 9 AM to 3 PM.  Her phone number is [phone].  She stated

that her mother lives with her shantell Lainez. Daughter came to her 

mother's home because mother did not answer her phone.  When she found her 

mother laying on the right side, she noticed facial swelling.  Daughter was 

concerned that patient would return to home from dialysis with significant 

swelling.  She also onset initially told daughter that Ms. Huang did attend 

hemodialysis today.  After my questioning, Can confronted shantell reyna who 

admitted the patient has not had hemodialysis since Tuesday.  Consequently Abraham

is gave EMS incorrect information regarding hemodialysis compliance.  Daughter 

states that patient is often agitated.  She appears to be at baseline mental 

status.





Patient receives dialysis Tuesday Thursday Friday at [facility] on [address]





Nephrologist is Dr. Boogie





Patient's PCP is Dr. Ted Contreras








Timing/Duration: other (At least 1 day)


Fever Severity/Quality: greater than 102 F


Associated Symptoms: other (SVT AMS)





ED Review of Systems


ROS: 


Stated complaint: AMS/SVT


Other details as noted in HPI





Comment: Unobtainable due to pts medical conditions (altered mental status)





ED Past Medical Hx





- Past Medical History


Previous Medical History?: Yes


Hx Hypertension: Yes


Hx CVA: Yes


Hx Diabetes: Yes


Hx Renal Disease: Yes (ESRD)





- Surgical History


Past Surgical History?: Yes


Additional Surgical History: L arm fistula, gastric bypass





- Social History


Smoking Status: Unknown if ever smoked





- Medications


Home Medications: 


                                Home Medications











 Medication  Instructions  Recorded  Confirmed  Last Taken  Type


 


Gabapentin 100 mg PO BID 03/14/20 10/17/20 03/08/20 History


 


traMADoL [Ultram 50 MG tab] 50 mg PO PRN 03/14/20 10/17/20 Unknown History


 


Cinacalcet HCl [Sensipar] 30 mg PO QDAY #30 04/26/20 10/17/20 Unknown Rx


 


Epoetin Jayy 10,000 Unit [Procrit] 10,000 unit IV SAVANNAH PRN #1 vial 04/26/20 

10/17/20 Unknown Rx


 


Famotidine [Pepcid] 20 mg PO QDAY #30 04/26/20 10/17/20 Unknown Rx


 


cloNIDine 0.1 mg PO QDAY #30 04/26/20 10/17/20 03/08/20 Rx


 


methylPREDNISolone [Medrol 4MG 1 tab PO QDAY #1 tab.ds.pk 04/26/20 10/17/20 

Unknown Rx





DOSEPAK (21 tabs)]     


 


oxyCODONE /ACETAMINOPHEN [Percocet 1 tab PO Q4H PRN #15 04/26/20 10/17/20 

Unknown Rx





5/325 mg]     


 


Aspirin [Aspirin BABY CHEW TAB] 81 mg PO QDAY #30 tab.chew 10/19/20  Unknown Rx


 


AtorvaSTATin [Lipitor] 20 mg PO QHS #30 tablet 10/19/20  Unknown Rx


 


Clopidogrel [Plavix] 75 mg PO QDAY #30 tablet 10/19/20  Unknown Rx


 


ISOSORBIDE MONOnitrate [Imdur ER] 30 mg PO QDAY #30 tablet 10/19/20  Unknown Rx


 


Metoprolol Xl [Metoprolol 25 mg PO QDAY #30 tablet 10/19/20  Unknown Rx





SUCCINATE ER TAB]     


 


oxyCODONE /ACETAMINOPHEN [Percocet 1 tab PO TID PRN #10 tablet 10/19/20  Unknown

 Rx





5/325]     














ED Physical Exam





- General


Limitations: Altered Mental Status


General appearance: other (Agitated yelling when touched restless)





- Head


Head exam: Present: atraumatic, normocephalic





- Eye


Eye exam: Present: normal appearance, periorbital swelling (Right eye)





- ENT


ENT exam: Present: mucous membranes moist





- Neck


Neck exam: Present: normal inspection, full ROM





- Respiratory


Respiratory exam: Present: normal lung sounds bilaterally.  Absent: respiratory 

distress, wheezes, rales, rhonchi





- Cardiovascular


Cardiovascular Exam: Present: normal rhythm, tachycardia, normal heart sounds.  

Absent: systolic murmur, diastolic murmur, rubs, gallop





- GI/Abdominal


GI/Abdominal exam: Present: soft, normal bowel sounds.  Absent: distended, 

tenderness, guarding, rebound





- Back Exam


Back exam: Present: normal inspection





- Neurological Exam


Neurological exam: Present: altered





- Psychiatric


Psychiatric exam: Present: agitated





- Skin


Skin exam: Present: warm, dry, intact, normal color, other (Left bicep: AV 

fistula with old bandages positive thrill positive bruit).  Absent: rash





ED Course


                                   Vital Signs











  02/27/21 02/27/21 02/27/21





  19:00 19:05 19:10


 


Temperature 101.2 F H  


 


Pulse Rate 196 H 195 H 150 H


 


Respiratory 16 16 25 H





Rate   


 


Blood Pressure   


 


Blood Pressure 140/123 166/141 153/88





[Right]   


 


O2 Sat by Pulse 90 92 95





Oximetry   














  02/27/21 02/27/21 02/27/21





  19:15 19:20 19:25


 


Temperature   


 


Pulse Rate 191 H 167 H 104 H


 


Respiratory 21 15 17





Rate   


 


Blood Pressure 162/120 141/85 


 


Blood Pressure 162/120 141/85 149/88





[Right]   


 


O2 Sat by Pulse 95 99 97





Oximetry   














  02/27/21 02/27/21 02/27/21





  19:30 19:35 19:40


 


Temperature   


 


Pulse Rate 106 H 108 H 108 H


 


Respiratory 10 L 13 12





Rate   


 


Blood Pressure   


 


Blood Pressure 129/89 131/92 139/121





[Right]   


 


O2 Sat by Pulse 98 96 97





Oximetry   














  02/27/21 02/27/21





  19:45 20:00


 


Temperature  


 


Pulse Rate 108 H 104 H


 


Respiratory 16 10 L





Rate  


 


Blood Pressure  


 


Blood Pressure 151/92 130/104





[Right]  


 


O2 Sat by Pulse 2 L 98





Oximetry  














ED Medical Decision Making





- Lab Data


Result diagrams: 


                                 02/27/21 19:51





                                 02/27/21 19:51





- EKG Data


-: EKG Interpreted by Me


Rate: tachycardia





- EKG Data





02/27/21 21:09


First EKG obtained upon arrival at 1900


EKG interpreted by me


Narrow complex supraventricular tachycardia rate 200 bpm left axis deviation 

peaked increased amplitude T waves no ST elevation positive LVH





Second EKG obtained 1928 after adenosine and beta-blocker


EKG interpreted by me


Sinus tachycardia rate 110 bpm left axis deviation normal QTC normal WA interval

 no ST elevation peaked increased amplitude T waves nonspecific T wave pattern 

positive LVH





- Radiology Data


Radiology results: report reviewed





CT facial bones: Right preseptal periorbital soft tissue swelling no underlying 

globe or intraorbital abnormality





CT head: No acute intracranial abnormality





- Medical Decision Making





1.  SVT: Patient had documented SVT during hemodialysis according to previous 

cardiology consultation.  In the past adenosine to convert the rhythm to sinus 

rhythm.  Today in spite 3 doses of adenosine, SVT recurred after brief rhythm 

changed to sinus tachycardia.  2 doses of metoprolol IV 5 mg  maintained sinus 

tachycardia rate 106 110 bpm with stable blood pressure.





2.  Fever/SIRS: Suspected Covid-19 no other evident infectious source.  No 

indication of infiltrate on chest radiograph.  UTI negative for UTI.  Normal 

WBC.  Broad-spectrum antibiotics initiated.  Covid precautions including contact

 and droplet isolation initiated. Covid markers ferritin, LDH, CRP all elevated.





3.  Acute uremic encephalopathy: Due to patient's noncompliance I suspect 

patient's agitation is due to significant uremia.





4.  Hypoglycemia: Patient was given dextrose.





5.  Uremic complications of hyperkalemia and hypervolemia (facial swelling 

dependent edema) due to medication noncompliance.  Patient has not been to 

dialysis since Tuesday.  She has missed 2 hemodialysis sessions.  Hyperkalemia 

treated in emergency department.  No significant pulmonary edema or respiratory 

distress. Dr. Baker nephrologist will consult and arrange AM HD.





6. Type 2 NSTEMI due to demand ischemia related to SVT: patient had recent 

cardiology consultation during last hospitalization.  patient refused LHC.  Will

 evaluate with serial troponins, 








Critical Care Time: Yes


Critical care time in (mins) excluding proc time.: 40


Critical care attestation.: 


If time is entered above; I have spent that time in minutes in the direct care 

of this critically ill patient, excluding procedure time.


40 minutes of critical care time excluding procedures were used in the care of 

the patient.   I came immediately to the bedside upon patient's arrival.  I 

obtained history from EMS at the bedside. I discussed treatment plan with the 

nursing team members. I reviewed electronic record.  I spoke with mother.  I a

ttempted to call caregiver daughter.  Patient required multiple interventions 

and reassessments.





ED Disposition


Clinical Impression: 


 Acute metabolic encephalopathy, SIRS (systemic inflammatory response syndrome),

 SVT (supraventricular tachycardia), Suspected COVID-19 virus infection, 

Hyperkalemia, Hypervolemia, End-stage renal disease on hemodialysis, NSTEMI 

(non-ST elevated myocardial infarction), Missed dialysis, Medical non-

compliance, ESRD (end stage renal disease) on dialysis





Disposition: [id number] OP ADMIT IP TO THIS HOSP


Is pt being admited?: Yes


Does the pt Need Aspirin: No


Condition: Fair

## 2021-02-27 NOTE — CAT SCAN REPORT
CT MAXILLOFACIAL WITHOUT CONTRAST



INDICATION:

Facial swelling.



TECHNIQUE:

All CT scans at this location are performed using CT dose reduction for ALARA by means of automated e
xposure control. 



COMPARISON:

None available.



FINDINGS:

FACIAL BONES: There is diffuse sclerosis throughout the facial bones which could be seen in the setti
ng of renal osteodystrophy. Accounting for motion artifact, no displaced fracture is seen.

PARANASAL SINUSES: There is mucosal thickening in the right frontal sinus and within the right maxill
parish sinus. No air-fluid levels are identified.

ORBITS: No significant abnormality.



VISUALIZED INTRACRANIAL STRUCTURES: No significant abnormality.  



ADDITIONAL FINDINGS: There is significant subcutaneous soft tissue edema/swelling in the right perior
bital region.



IMPRESSION:

1.   Significant right preseptal periorbital soft tissue swelling. No underlying globe or intraorbita
l abnormality. This could be due to trauma or inflammation. No acute skeletal abnormality accounting 
for motion artifact.









CT HEAD WITHOUT CONTRAST



INDICATION:

altered mental status hx of HTN CVA.



TECHNIQUE:

All CT scans at this location are performed using CT dose reduction for ALARA by means of automated e
xposure control. 



COMPARISON:

None available.



FINDINGS:

HEMORRHAGE: None.

EXTRA-AXIAL SPACES: Normal in size and morphology for the patient's age.

VENTRICULAR SYSTEM: Normal in size and morphology for the patient's age.

BRAIN PARENCHYMA: No acute findings. Extensive deep periventricular white matter hypodensities are no
gail, likely due to small vessel ischemic disease. There are also chronic bilateral basal ganglia lacu
yessenia infarcts.

MIDLINE SHIFT OR HERNIATION: None.





SOFT TISSUES OF HEAD: Normal.

CALVARIUM: There is diffuse osteosclerosis which could be seen in the setting of renal osteodystrophy
. No acute skeletal abnormality.



ADDITIONAL FINDINGS: None.



IMPRESSION:

1. No acute intracranial abnormality.



Signer Name: Dillon Martinez MD 

Signed: 2/27/2021 9:24 PM

Workstation Name: [id number]

## 2021-02-28 LAB
BASOPHILS # (AUTO): 0.1 K/MM3 (ref 0–0.1)
BASOPHILS NFR BLD AUTO: 0.9 % (ref 0–1.8)
BUN SERPL-MCNC: 84 MG/DL (ref 7–17)
BUN/CREAT SERPL: 6 %
CALCIUM SERPL-MCNC: 9.9 MG/DL (ref 8.4–10.2)
EOSINOPHIL # BLD AUTO: 0.4 K/MM3 (ref 0–0.4)
EOSINOPHIL NFR BLD AUTO: 6.1 % (ref 0–4.3)
HCT VFR BLD CALC: 33.6 % (ref 30.3–42.9)
HEMOLYSIS INDEX: 2
HGB BLD-MCNC: 10.8 GM/DL (ref 10.1–14.3)
INR PPP: 1.28 (ref 0.87–1.13)
LYMPHOCYTES # BLD AUTO: 1.3 K/MM3 (ref 1.2–5.4)
LYMPHOCYTES NFR BLD AUTO: 19.7 % (ref 13.4–35)
MCHC RBC AUTO-ENTMCNC: 32 % (ref 30–34)
MCV RBC AUTO: 92 FL (ref 79–97)
MONOCYTES # (AUTO): 0.8 K/MM3 (ref 0–0.8)
MONOCYTES % (AUTO): 11.6 % (ref 0–7.3)
PLATELET # BLD: 218 K/MM3 (ref 140–440)
RBC # BLD AUTO: 3.64 M/MM3 (ref 3.65–5.03)

## 2021-02-28 PROCEDURE — 5A1D70Z PERFORMANCE OF URINARY FILTRATION, INTERMITTENT, LESS THAN 6 HOURS PER DAY: ICD-10-PCS | Performed by: INTERNAL MEDICINE

## 2021-02-28 RX ADMIN — CINACALCET HYDROCHLORIDE SCH MG: 30 TABLET, FILM COATED ORAL at 09:15

## 2021-02-28 RX ADMIN — INSULIN LISPRO SCH: 100 INJECTION, SOLUTION INTRAVENOUS; SUBCUTANEOUS at 11:47

## 2021-02-28 RX ADMIN — INSULIN LISPRO SCH: 100 INJECTION, SOLUTION INTRAVENOUS; SUBCUTANEOUS at 16:07

## 2021-02-28 RX ADMIN — Medication SCH ML: at 09:17

## 2021-02-28 RX ADMIN — ASPIRIN SCH MG: 81 TABLET, CHEWABLE ORAL at 09:15

## 2021-02-28 RX ADMIN — METOPROLOL SUCCINATE SCH MG: 25 TABLET, FILM COATED, EXTENDED RELEASE ORAL at 09:16

## 2021-02-28 RX ADMIN — GABAPENTIN SCH MG: 100 CAPSULE ORAL at 09:15

## 2021-02-28 RX ADMIN — INSULIN LISPRO SCH: 100 INJECTION, SOLUTION INTRAVENOUS; SUBCUTANEOUS at 08:49

## 2021-02-28 RX ADMIN — METRONIDAZOLE SCH MLS/HR: 5 INJECTION, SOLUTION INTRAVENOUS at 23:40

## 2021-02-28 RX ADMIN — DEXTROSE MONOHYDRATE PRN ML: 25 INJECTION, SOLUTION INTRAVENOUS at 12:13

## 2021-02-28 RX ADMIN — CLOPIDOGREL BISULFATE SCH MG: 75 TABLET ORAL at 09:15

## 2021-02-28 RX ADMIN — FAMOTIDINE SCH MG: 20 TABLET ORAL at 09:15

## 2021-02-28 RX ADMIN — METRONIDAZOLE SCH MLS/HR: 5 INJECTION, SOLUTION INTRAVENOUS at 16:24

## 2021-02-28 RX ADMIN — DEXTROSE MONOHYDRATE PRN ML: 25 INJECTION, SOLUTION INTRAVENOUS at 23:40

## 2021-02-28 NOTE — CONSULTATION
History of Present Illness





- Reason for Consult


Consult date: 02/28/21


end stage renal disease





- History of Present Illness








This is a 50 year old  female who presented to the E.R yesterday

with a chief complaint of Altered mental Status. Patient was seen lying in bed 

and is drowsy. RN states patient had received Ativan earlier. Spoke with 

patient's daughter over the phone who states patient's Nephrologist ids Dr. Valero and that patient last dialysis session was on Tuesday as she missed 

dialysis on Thursday due to being in pain. Daughter states that if patient is in

pain she will skip dialysis sometimes. Patient noted to have elevated potassium 

level of 6.1 yesterday and was given kayexalate, today patient's potassium is 

5.9. We are being consulted for management of this patient's ESRD. 











Past History


Past Medical History: diabetes, dialysis, ESRD, hypertension, stroke


Past Surgical History: Other (Left arm A-V fistula, Gastric Bypass.)


Social history: no significant social history


Family history: no significant family history





Medications and Allergies


                                    Allergies











Allergy/AdvReac Type Severity Reaction Status Date / Time


 


diphenhydramine Allergy  Unknown Verified 08/05/19 02:48





[From Benadryl]     


 


lisinopril Allergy  Unknown Verified 08/05/19 02:48


 


seafood Allergy  Angioedema Uncoded 04/26/20 11:58











                                Home Medications











 Medication  Instructions  Recorded  Confirmed  Last Taken  Type


 


Gabapentin 100 mg PO BID 03/14/20 10/17/20 03/08/20 History


 


traMADoL [Ultram 50 MG tab] 50 mg PO PRN 03/14/20 10/17/20 Unknown History


 


Cinacalcet HCl [Sensipar] 30 mg PO QDAY #30 04/26/20 10/17/20 Unknown Rx


 


Epoetin Jayy 10,000 Unit [Procrit] 10,000 unit IV SAVANNAH PRN #1 vial 04/26/20 

10/17/20 Unknown Rx


 


Famotidine [Pepcid] 20 mg PO QDAY #30 04/26/20 10/17/20 Unknown Rx


 


cloNIDine 0.1 mg PO QDAY #30 04/26/20 10/17/20 03/08/20 Rx


 


methylPREDNISolone [Medrol 4MG 1 tab PO QDAY #1 tab.ds.pk 04/26/20 10/17/20 

Unknown Rx





DOSEPAK (21 tabs)]     


 


oxyCODONE /ACETAMINOPHEN [Percocet 1 tab PO Q4H PRN #15 04/26/20 10/17/20 

Unknown Rx





5/325 mg]     


 


Aspirin [Aspirin BABY CHEW TAB] 81 mg PO QDAY #30 tab.chew 10/19/20  Unknown Rx


 


AtorvaSTATin [Lipitor] 20 mg PO QHS #30 tablet 10/19/20  Unknown Rx


 


Clopidogrel [Plavix] 75 mg PO QDAY #30 tablet 10/19/20  Unknown Rx


 


ISOSORBIDE MONOnitrate [Imdur ER] 30 mg PO QDAY #30 tablet 10/19/20  Unknown Rx


 


Metoprolol Xl [Metoprolol 25 mg PO QDAY #30 tablet 10/19/20  Unknown Rx





SUCCINATE ER TAB]     


 


oxyCODONE /ACETAMINOPHEN [Percocet 1 tab PO TID PRN #10 tablet 10/19/20  Unknown

 Rx





5/325]     











Active Meds: 


Active Medications





Acetaminophen (Acetaminophen 325 Mg Tab)  650 mg PO Q4H PRN


   PRN Reason: Pain MILD(1-3)/Fever >100.5/HA


Aspirin (Aspirin 81 Mg Tab Chew)  81 mg PO QDAY [facility]


   Last Admin: 02/28/21 09:15 Dose:  81 mg


   Documented by: 


Atorvastatin Calcium (Atorvastatin 20 Mg Tab)  20 mg PO QHS [facility]


Cinacalcet (Cinacalcet 30 Mg Tab)  30 mg PO QDAY [facility]


   Last Admin: 02/28/21 09:15 Dose:  30 mg


   Documented by: 


Clonidine HCl (Clonidine 0.1 Mg Tab)  0.1 mg PO QDAY [facility]


   Last Admin: 02/28/21 09:16 Dose:  0.1 mg


   Documented by: 


Clopidogrel Bisulfate (Clopidogrel 75 Mg Tab)  75 mg PO QDAY [facility]


   Last Admin: 02/28/21 09:15 Dose:  75 mg


   Documented by: 


Dextrose (Dextrose 50% In Water (25gm) 50 Ml Syringe)  50 ml IV Q30MIN PRN; 

Protocol


   PRN Reason: Hypoglycemia


Famotidine (Famotidine 20 Mg Tab)  20 mg PO QDAY [facility]


   Last Admin: 02/28/21 09:15 Dose:  20 mg


   Documented by: 


Gabapentin (Gabapentin 100 Mg Cap)  100 mg PO BID [facility]


   Last Admin: 02/28/21 09:15 Dose:  100 mg


   Documented by: 


Cefepime HCl (Cefepime/Ns 1 Gm/100 Ml)  1 gm in 100 mls @ 200 mls/hr IV Q24H 

[facility]; Protocol


Insulin Human Lispro (Insulin Lispro 100 Unit/Ml)  0 unit SUB-Q ACHS [facility]; 

Protocol


   Last Admin: 02/28/21 08:49 Dose:  Not Given


   Documented by: 


Isosorbide Mononitrate (Isosorbide Mononitrate Er 30 Mg Tab)  30 mg PO QDAY [facility]


   Last Admin: 02/28/21 09:15 Dose:  30 mg


   Documented by: 


Magnesium Hydroxide (Magnesium Hydroxide (Mom) Oral Liqd Udc)  30 ml PO Q4H PRN


   PRN Reason: Constipation


Metoprolol Succinate (Metoprolol Succinate Xl 25 Mg Tab)  25 mg PO QDAY [facility]


   Last Admin: 02/28/21 09:16 Dose:  25 mg


   Documented by: 


Morphine Sulfate (Morphine 2 Mg/1 Ml Inj)  2 mg IV Q4H PRN


   PRN Reason: Pain, Moderate (4-6)


Ondansetron HCl (Ondansetron 4 Mg/2 Ml Inj)  4 mg IV Q8H PRN


   PRN Reason: Nausea And Vomiting


Sodium Chloride (Sodium Chloride 0.9% 10 Ml Flush Syringe)  10 ml IV BID [facility]


   Last Admin: 02/28/21 09:17 Dose:  10 ml


   Documented by: 


Sodium Chloride (Sodium Chloride 0.9% 10 Ml Flush Syringe)  10 ml IV PRN PRN


   PRN Reason: LINE FLUSH











Review of Systems


ROS unobtainable: due to mental status





Exam





- Vital Signs


Vital signs: 


                                   Vital Signs











Temp Pulse Resp BP Pulse Ox


 


 101.2 F H  196 H  16   140/123   90 


 


 02/27/21 19:00  02/27/21 19:00  02/27/21 19:00  02/27/21 19:00  02/27/21 19:00














- General Appearance


General appearance: other (Patient is drowsy, awakens briefly to tactile and 

verbal stimuli)


EENT: other (Right eye is swollen shut)


Neck: Present: neck supple


Respiratory: Decreased Breath Sounds


Heart: S1S2


Gastrointestinal: Present: normoactive bowel sounds


Integumentary: warm and dry


Neurologic: other (Drowsy )


Musculoskeletal: Present: other (mild edema to BLE, tender to touch)





Results





- Lab Results





                                 02/28/21 04:36





                                 02/28/21 04:36


                             Most recent lab results











Calcium  9.9 mg/dL (8.4-10.2)   02/28/21  04:36    














Assessment and Plan








Assessment:


(Principal problem)-Altered mental Status


(Principal problem)-Hyperkalemia


(Principal problem)-SVT


End Stage Renal Disease


Hypertension


Anemia


COVID-19 R/O








Plan: 


-Hemodialysis today for UF and clearance and hyperkalemia


-Fluid restriction of 1 liter per day


-Low potassium diet


-Monitor I/O's


-Obtain daily weights


-Assess dialysis needs daily

## 2021-02-28 NOTE — PROGRESS NOTE
Assessment and Plan


Assessment and plan: 


--Acute toxic metabolic encephalopathy


Current Visit: Yes   Status: Acute   


Plan to address problem: 


Multifactorial, uremia, severe hyperkalemia


SVT and possible COVID-19


Treat the underlying cause, supportive care


Neurochecks


CT ;head negative for acute abnormality


CT face; periorbital swelling





--Right eye lid and facial swelling:


Current Visit: Yes   Status: Acute   


Plan to address problem: 





--Hyperkalemia


Current Visit: Yes   Status: Acute   


Plan to address problem: 


Patient  received insulin and glucose, calcium gluconate and sodium bicarb.


This morning's potassium is 5.6, calcium gluconate 1 dose


Hemodialysis, nephrology consulted





--ESRD on hemodialysis TTS


Current Visit: Yes   Status: Chronic   


Plan to address problem: 


Hemodialysis per schedule


Nephrology consulted





--Noncompliance with HD


Current Visit: Yes   Status: Acute   


Plan to address problem: 


Patient strongly advised to comply with medications diet 


follow-up visit especially hemodialysis per schedule





--SVT (supraventricular tachycardia)


Current Visit: Yes   Status: Acute   


Plan to address problem: 


Patient received adenosine, 


Resumed home medication metoprolol


Cardiology consulted





--Chronic elevation of troponin/NSTEMI 2


Current Visit: Yes   Status: Acute   


Plan to address problem: 


Probably due to ESRD


However patient has multiple risk factors.  


Cardiology consulted





--PUI /suspected COVID-19 infection


Current Visit: Yes   Status: Acute   


Plan to address problem: 


Contact and droplet isolation precautions.  


Requested Corona PCR test 


ID consulted by admitting physician





--DM2 (diabetes mellitus, type 2)


Current Visit: No   Status: Chronic   


Plan to address problem: 


Accu-Chek sliding scale coverage ADA diet


Insulin as needed





--Hypertensive urgency; POA


Current Visit: No   Status: Chronic.  


Now moderate control, continue current antihypertensives


As needed hydralazine   





--DVT prophylaxis


Current Visit: No   Status: Acute   


Plan to address problem: 


Continue subcu heparin renal dose





--Full code status


Current Visit: Yes   Status: Acute   


Plan to address problem: 


Patient is a full code.





Closely monitor the patient and adjust the management as needed.


Consults and recommendations noted and appreciated


Plan of care reviewed with the patient and patient's nurse














History


Interval history: 


I have seen and examined the patient at the bedside


Patient's chart and medications reviewed


Patient PUI and contact and droplet isolation


Admitted with altered level of consciousness right facial eyelid swelling


Vital signs noted


Patient is minimally communicative








Hospitalist Physical





- Constitutional


Vitals: 


                                        











Temp Pulse Resp BP Pulse Ox


 


 98.5 F   108 H  19   139/82   99 


 


 02/28/21 05:18  02/28/21 05:18  02/28/21 05:18  02/28/21 05:18  02/28/21 05:18











General appearance: Present: mild distress, well-nourished, other (Uncooperative

noncommunicative)





- EENT


Eyes: Present: PERRL (Right eyelid swelling unable to open the eye)





- Neck


Neck: Present: supple, normal ROM





- Respiratory


Respiratory effort: normal


Respiratory: bilateral: diminished, rales, negative: rhonchi, wheezing





- Cardiovascular


Rhythm: regular


Heart Sounds: Present: S1 & S2





- Extremities


Extremities: no ischemia, No edema


Extremity abnormal: edema





- Abdominal


General gastrointestinal: soft, non-tender, non-distended, normal bowel sounds





- Integumentary


Integumentary: Present: clear, warm





- Psychiatric


Psychiatric: other (Minimally communicative and cooperative)





- Neurologic


Neurologic: moves all extremities





HEART Score





- HEART Score


Troponin: 


                                        











Troponin T  0.587 ng/mL (0.00-0.029)  H*  02/27/21  19:51    














Results





- Labs


CBC & Chem 7: 


                                 02/28/21 04:36





                                 02/28/21 04:36


Labs: 


                             Laboratory Last Values











WBC  6.8 K/mm3 (4.5-11.0)   02/28/21  04:36    


 


RBC  3.64 M/mm3 (3.65-5.03)  L  02/28/21  04:36    


 


Hgb  10.8 gm/dl (10.1-14.3)   02/28/21  04:36    


 


Hct  33.6 % (30.3-42.9)   02/28/21  04:36    


 


MCV  92 fl (79-97)   02/28/21  04:36    


 


MCH  30 pg (28-32)   02/28/21  04:36    


 


MCHC  32 % (30-34)   02/28/21  04:36    


 


RDW  17.2 % (13.2-15.2)  H  02/28/21  04:36    


 


Plt Count  218 K/mm3 (140-440)   02/28/21  04:36    


 


Lymph % (Auto)  19.7 % (13.4-35.0)   02/28/21  04:36    


 


Mono % (Auto)  11.6 % (0.0-7.3)  H  02/28/21  04:36    


 


Eos % (Auto)  6.1 % (0.0-4.3)  H  02/28/21  04:36    


 


Baso % (Auto)  0.9 % (0.0-1.8)   02/28/21  04:36    


 


Lymph # (Auto)  1.3 K/mm3 (1.2-5.4)   02/28/21  04:36    


 


Mono # (Auto)  0.8 K/mm3 (0.0-0.8)   02/28/21  04:36    


 


Eos # (Auto)  0.4 K/mm3 (0.0-0.4)   02/28/21  04:36    


 


Baso # (Auto)  0.1 K/mm3 (0.0-0.1)   02/28/21  04:36    


 


Seg Neutrophils %  61.7 % (40.0-70.0)   02/28/21  04:36    


 


Seg Neutrophils #  4.2 K/mm3 (1.8-7.7)   02/28/21  04:36    


 


PT  16.0 Sec. (12.2-14.9)  H  02/28/21  04:36    


 


INR  1.28  (0.87-1.13)  H  02/28/21  04:36    


 


D-Dimer  2202.99 ng/mlDDU (0-234)  H  02/27/21  19:51    


 


Sodium  145 mmol/L (137-145)   02/28/21  04:36    


 


Potassium  5.9 mmol/L (3.6-5.0)  H  02/28/21  04:36    


 


Chloride  99.4 mmol/L ()   02/28/21  04:36    


 


Carbon Dioxide  26 mmol/L (22-30)   02/28/21  04:36    


 


Anion Gap  26 mmol/L  02/28/21  04:36    


 


BUN  84 mg/dL (7-17)  H  02/28/21  04:36    


 


Creatinine  14.7 mg/dL (0.6-1.2)  H  02/28/21  04:36    


 


Estimated GFR  3 ml/min  02/28/21  04:36    


 


BUN/Creatinine Ratio  6 %  02/28/21  04:36    


 


Glucose  78 mg/dL ()   02/28/21  04:36    


 


POC Glucose  80 mg/dL ()   02/28/21  01:38    


 


Lactic Acid  1.70 mmol/L (0.7-2.0)   02/28/21  04:36    


 


Calcium  9.9 mg/dL (8.4-10.2)   02/28/21  04:36    


 


Ferritin  1434.0 ng/mL (10.0-200.0)  H  02/27/21  19:51    


 


Total Bilirubin  0.80 mg/dL (0.1-1.2)   02/27/21  19:51    


 


AST  10 units/L (5-40)   02/27/21  19:51    


 


ALT  < 5 units/L (7-56)  L  02/27/21  19:51    


 


Alkaline Phosphatase  500 units/L ()  H  02/27/21  19:51    


 


Lactate Dehydrogenase  213 units/L ()  H  02/27/21  19:51    


 


Troponin T  0.587 ng/mL (0.00-0.029)  H*  02/27/21  19:51    


 


C-Reactive Protein  3.90 mg/dL (0.00-1.30)  H  02/27/21  19:51    


 


Total Protein  6.8 g/dL (6.3-8.2)   02/27/21  19:51    


 


Albumin  3.0 g/dL (3.9-5)  L  02/27/21  19:51    


 


Albumin/Globulin Ratio  0.8 %  02/27/21  19:51    


 


Triglycerides  236 mg/dL (2-149)  H  02/27/21  19:51    


 


Cholesterol  202 mg/dL ()  H  02/27/21  19:51    


 


LDL Cholesterol Direct  114 mg/dL ()   02/27/21  19:51    


 


HDL Cholesterol  34 mg/dL (40-59)  L  02/27/21  19:51    


 


Cholesterol/HDL Ratio  5.94 %  02/27/21  19:51    


 


TSH  6.100 mlU/mL (0.270-4.200)  H  02/28/21  00:37    


 


Free T4  0.85 ng/dL (0.76-1.46)   02/28/21  00:37    


 


Urine Color  Yellow  (Yellow)   02/27/21  20:42    


 


Urine Turbidity  Clear  (Clear)   02/27/21  20:42    


 


Urine pH  7.0  (5.0-7.0)   02/27/21  20:42    


 


Ur Specific Gravity  1.009  (1.003-1.030)   02/27/21  20:42    


 


Urine Protein  100 mg/dl mg/dL (Negative)   02/27/21  20:42    


 


Urine Glucose (UA)  Neg mg/dL (Negative)   02/27/21  20:42    


 


Urine Ketones  Neg mg/dL (Negative)   02/27/21  20:42    


 


Urine Blood  Neg  (Negative)   02/27/21  20:42    


 


Urine Nitrite  Neg  (Negative)   02/27/21  20:42    


 


Urine Bilirubin  Neg  (Negative)   02/27/21  20:42    


 


Urine Urobilinogen  < 2.0 mg/dL (<2.0)   02/27/21  20:42    


 


Ur Leukocyte Esterase  Neg  (Negative)   02/27/21  20:42    


 


Urine WBC (Auto)  1.0 /HPF (0.0-6.0)   02/27/21  20:42    


 


Urine RBC (Auto)  < 1.0 /HPF (0.0-6.0)   02/27/21  20:42    











Microbiology: 


Microbiology





02/27/21 19:51   Peripheral/Venous   Blood Culture - Preliminary


                            Culture in Progress


02/27/21 19:51   Peripheral/Venous   Blood Culture - Preliminary


                            Culture in Progress











Active Medications





- Current Medications


Current Medications: 














Generic Name Dose Route Start Last Admin





  Trade Name Freq  PRN Reason Stop Dose Admin


 


Acetaminophen  650 mg  02/27/21 22:22 





  Acetaminophen 325 Mg Tab  PO  





  Q4H PRN  





  Pain MILD(1-3)/Fever >100.5/HA  


 


Aspirin  81 mg  02/28/21 10:00 





  Aspirin 81 Mg Tab Chew  PO  





  QDAY [facility]  


 


Atorvastatin Calcium  20 mg  02/28/21 22:00 





  Atorvastatin 20 Mg Tab  PO  





  QHS [facility]  


 


Cinacalcet  30 mg  02/28/21 10:00 





  Cinacalcet 30 Mg Tab  PO  





  QDAY [facility]  


 


Clonidine HCl  0.1 mg  02/28/21 10:00 





  Clonidine 0.1 Mg Tab  PO  





  QDAY [facility]  


 


Clopidogrel Bisulfate  75 mg  02/28/21 10:00 





  Clopidogrel 75 Mg Tab  PO  





  QDAY [facility]  


 


Dextrose  50 ml  02/27/21 22:22 





  Dextrose 50% In Water (25gm) 50 Ml Syringe  IV  





  Q30MIN PRN  





  Hypoglycemia  





  Protocol  


 


Famotidine  20 mg  02/28/21 10:00 





  Famotidine 20 Mg Tab  PO  





  QDAY [facility]  


 


Gabapentin  100 mg  02/28/21 10:00 





  Gabapentin 100 Mg Cap  PO  





  BID [facility]  


 


Cefepime HCl  1 gm in 100 mls @ 200 mls/hr  02/28/21 20:00 





  Cefepime/Ns 1 Gm/100 Ml  IV  





  Q24H [facility]  





  Protocol  


 


Calcium Gluconate 1,000 mg/  110 mls @ 660 mls/hr  02/28/21 07:56 





  Sodium Chloride  IV  02/28/21 08:05 





  ONCE ONE  


 


Insulin Human Lispro  0 unit  02/28/21 07:30 





  Insulin Lispro 100 Unit/Ml  SUB-Q  





  [facility]  





  Protocol  


 


Isosorbide Mononitrate  30 mg  02/28/21 10:00 





  Isosorbide Mononitrate Er 30 Mg Tab  PO  





  QDAY [facility]  


 


Magnesium Hydroxide  30 ml  02/27/21 22:22 





  Magnesium Hydroxide (Mom) Oral Liqd Udc  PO  





  Q4H PRN  





  Constipation  


 


Metoprolol Succinate  25 mg  02/28/21 10:00 





  Metoprolol Succinate Xl 25 Mg Tab  PO  





  QDAY [facility]  


 


Morphine Sulfate  2 mg  02/27/21 22:22 





  Morphine 2 Mg/1 Ml Inj  IV  





  Q4H PRN  





  Pain, Moderate (4-6)  


 


Ondansetron HCl  4 mg  02/27/21 22:22 





  Ondansetron 4 Mg/2 Ml Inj  IV  





  Q8H PRN  





  Nausea And Vomiting  


 


Sodium Chloride  10 ml  02/28/21 10:00 





  Sodium Chloride 0.9% 10 Ml Flush Syringe  IV  





  BID [facility]  


 


Sodium Chloride  10 ml  02/27/21 22:22 





  Sodium Chloride 0.9% 10 Ml Flush Syringe  IV  





  PRN PRN  





  LINE FLUSH

## 2021-02-28 NOTE — CONSULTATION
History of Present Illness





- Reason for Consult


Consult date: 02/28/21


Fever, altered mental status, rule out Covid


Requesting physician: ANA KHALIL





- History of Present Illness


50 years old female with history of end-stage renal disease, diabetes mellitus, 

CVA, SVT, admitted on 2/27/2021 secondary to altered mental status.  History is 

limited, patient is confused and somnolent unable to provide history.  Per 

daughter report, she was having some soft tissue facial swelling on the right 

side on the day of admission and she was lying down on that side.  Patient mi

ssed 2 sessions of hemodialysis.  On arrival, temperature 101.2, , RR 16, 

O2 sat 90%, blood pressure 146/123.  Initial WBC 6.9.  Hemoglobin 10.8.  

Platelets 244.  Creatinine 14.7.  K6.2.  Lactate 2.7.  D-dimer 2202.  Ferritin 

1434.  CRP 3.9.  Urinalysis negative.  Troponin 0.5.  Blood cultures 2/27/2021 

pending.  Chest x-ray shows bilateral reticular infiltrates.  CT of the face 

shows right preseptal periorbital soft tissue swelling.  In the ED, patient 

received adenosine.








Review of Systems: Unable to obtain as patient is confused.





 











Past History


Past Medical History: diabetes, dialysis, ESRD, hypertension, stroke


Past Surgical History: Other (Left arm A-V fistula, Gastric Bypass.)


Social history: no significant social history


Family history: no significant family history





Medications and Allergies


                                    Allergies











Allergy/AdvReac Type Severity Reaction Status Date / Time


 


diphenhydramine Allergy  Unknown Verified 08/05/19 02:48





[From Benadryl]     


 


lisinopril Allergy  Unknown Verified 08/05/19 02:48


 


seafood Allergy  Angioedema Uncoded 04/26/20 11:58











                                Home Medications











 Medication  Instructions  Recorded  Confirmed  Last Taken  Type


 


Gabapentin 100 mg PO BID 03/14/20 10/17/20 03/08/20 History


 


traMADoL [Ultram 50 MG tab] 50 mg PO PRN 03/14/20 10/17/20 Unknown History


 


Cinacalcet HCl [Sensipar] 30 mg PO QDAY #30 04/26/20 10/17/20 Unknown Rx


 


Epoetin Jayy 10,000 Unit [Procrit] 10,000 unit IV SAVANNAH PRN #1 vial 04/26/20 

10/17/20 Unknown Rx


 


Famotidine [Pepcid] 20 mg PO QDAY #30 04/26/20 10/17/20 Unknown Rx


 


cloNIDine 0.1 mg PO QDAY #30 04/26/20 10/17/20 03/08/20 Rx


 


methylPREDNISolone [Medrol 4MG 1 tab PO QDAY #1 tab.ds.pk 04/26/20 10/17/20 

Unknown Rx





DOSEPAK (21 tabs)]     


 


oxyCODONE /ACETAMINOPHEN [Percocet 1 tab PO Q4H PRN #15 04/26/20 10/17/20 

Unknown Rx





5/325 mg]     


 


Aspirin [Aspirin BABY CHEW TAB] 81 mg PO QDAY #30 tab.chew 10/19/20  Unknown Rx


 


AtorvaSTATin [Lipitor] 20 mg PO QHS #30 tablet 10/19/20  Unknown Rx


 


Clopidogrel [Plavix] 75 mg PO QDAY #30 tablet 10/19/20  Unknown Rx


 


ISOSORBIDE MONOnitrate [Imdur ER] 30 mg PO QDAY #30 tablet 10/19/20  Unknown Rx


 


Metoprolol Xl [Metoprolol 25 mg PO QDAY #30 tablet 10/19/20  Unknown Rx





SUCCINATE ER TAB]     


 


oxyCODONE /ACETAMINOPHEN [Percocet 1 tab PO TID PRN #10 tablet 10/19/20  Unknown

 Rx





5/325]     











Active Meds: 


Active Medications





Acetaminophen (Acetaminophen 325 Mg Tab)  650 mg PO Q4H PRN


   PRN Reason: Pain MILD(1-3)/Fever >100.5/HA


Aspirin (Aspirin 81 Mg Tab Chew)  81 mg PO QDAY [facility]


   Last Admin: 02/28/21 09:15 Dose:  81 mg


   Documented by: 


Atorvastatin Calcium (Atorvastatin 20 Mg Tab)  20 mg PO QHS [facility]


Cinacalcet (Cinacalcet 30 Mg Tab)  30 mg PO QDAY [facility]


   Last Admin: 02/28/21 09:15 Dose:  30 mg


   Documented by: 


Clonidine HCl (Clonidine 0.1 Mg Tab)  0.1 mg PO QDAY [facility]


   Last Admin: 02/28/21 09:16 Dose:  0.1 mg


   Documented by: 


Clopidogrel Bisulfate (Clopidogrel 75 Mg Tab)  75 mg PO QDAY [facility]


   Last Admin: 02/28/21 09:15 Dose:  75 mg


   Documented by: 


Dextrose (Dextrose 50% In Water (25gm) 50 Ml Syringe)  50 ml IV Q30MIN PRN; 

Protocol


   PRN Reason: Hypoglycemia


Famotidine (Famotidine 20 Mg Tab)  20 mg PO QDAY [facility]


   Last Admin: 02/28/21 09:15 Dose:  20 mg


   Documented by: 


Gabapentin (Gabapentin 100 Mg Cap)  100 mg PO BID [facility]


   Last Admin: 02/28/21 09:15 Dose:  100 mg


   Documented by: 


Cefepime HCl (Cefepime/Ns 1 Gm/100 Ml)  1 gm in 100 mls @ 200 mls/hr IV Q24H 

[facility]; Protocol


Insulin Human Lispro (Insulin Lispro 100 Unit/Ml)  0 unit SUB-Q ACHS [facility]; 

Protocol


   Last Admin: 02/28/21 11:47 Dose:  Not Given


   Documented by: 


Isosorbide Mononitrate (Isosorbide Mononitrate Er 30 Mg Tab)  30 mg PO QDAY [facility]


   Last Admin: 02/28/21 09:15 Dose:  30 mg


   Documented by: 


Magnesium Hydroxide (Magnesium Hydroxide (Mom) Oral Liqd Udc)  30 ml PO Q4H PRN


   PRN Reason: Constipation


Metoprolol Succinate (Metoprolol Succinate Xl 25 Mg Tab)  25 mg PO QDAY [facility]


   Last Admin: 02/28/21 09:16 Dose:  25 mg


   Documented by: 


Morphine Sulfate (Morphine 2 Mg/1 Ml Inj)  2 mg IV Q4H PRN


   PRN Reason: Pain, Moderate (4-6)


Ondansetron HCl (Ondansetron 4 Mg/2 Ml Inj)  4 mg IV Q8H PRN


   PRN Reason: Nausea And Vomiting


Sodium Chloride (Sodium Chloride 0.9% 10 Ml Flush Syringe)  10 ml IV BID [facility]


   Last Admin: 02/28/21 09:17 Dose:  10 ml


   Documented by: 


Sodium Chloride (Sodium Chloride 0.9% 10 Ml Flush Syringe)  10 ml IV PRN PRN


   PRN Reason: LINE FLUSH











Physical Examination





- Physical Exam


Narrative exam: 








General appearance: Somnolent, in no acute distress


Eyes: Right eye proptosis no erythema, conjunctival erythema minimal, no 

purulence.  Patient is lying on the right side.


HENT: Normocephalic, Atraumatic; normal external ears, nares open, oropharynx 

limited


Neck: supple, tracheal midline, no JVD


Lungs: Clear to auscultation anteriorly


CV: Tachycardic


Abdomen: Soft, non-tender


Extremities: right index tenderness


Skin: No rash. 


Psych: no agitated


Neuro: Somnolent, nonverbal


 





- Constitutional


Vitals: 


                                   Vital Signs











Temp Pulse Resp BP Pulse Ox


 


 98.5 F   113 H  19   140/89   99 


 


 02/28/21 05:18  02/28/21 09:15  02/28/21 05:18  02/28/21 09:15  02/28/21 05:18








                           Temperature -Last 24 Hours











Temperature                    98.5 F


 


Temperature                    97.5 F


 


Temperature                    99.4 F


 


Temperature                    101.2 F

















Results





- Labs


CBC & Chem 7: 


                                 02/28/21 04:36





                                 02/28/21 04:36


Labs: 


                              Abnormal lab results











  02/27/21 02/27/21 02/27/21 Range/Units





  19:51 19:51 19:51 


 


RBC     (3.65-5.03)  M/mm3


 


RDW  16.9 H    (13.2-15.2)  %


 


Mono % (Auto)  11.4 H    (0.0-7.3)  %


 


Eos % (Auto)  9.8 H    (0.0-4.3)  %


 


Eos # (Auto)  0.7 H    (0.0-0.4)  K/mm3


 


PT     (12.2-14.9)  Sec.


 


INR     (0.87-1.13)  


 


D-Dimer   2202.99 H   (0-234)  ng/mlDDU


 


Potassium    6.2 H*  (3.6-5.0)  mmol/L


 


Chloride    97.5 L  ()  mmol/L


 


Carbon Dioxide    21 L  (22-30)  mmol/L


 


BUN    81 H  (7-17)  mg/dL


 


Creatinine    14.7 H  (0.6-1.2)  mg/dL


 


Glucose    58 L  ()  mg/dL


 


Lactic Acid     (0.7-2.0)  mmol/L


 


Ferritin     (10.0-200.0)  ng/mL


 


ALT    < 5 L  (7-56)  units/L


 


Alkaline Phosphatase    500 H  ()  units/L


 


Lactate Dehydrogenase     ()  units/L


 


Troponin T    0.587 H*  (0.00-0.029)  ng/mL


 


C-Reactive Protein     (0.00-1.30)  mg/dL


 


Albumin    3.0 L  (3.9-5)  g/dL


 


Triglycerides    236 H  (2-149)  mg/dL


 


Cholesterol    202 H  ()  mg/dL


 


HDL Cholesterol    34 L  (40-59)  mg/dL


 


TSH     (0.270-4.200)  mlU/mL














  02/27/21 02/27/21 02/27/21 Range/Units





  19:51 19:51 19:51 


 


RBC     (3.65-5.03)  M/mm3


 


RDW     (13.2-15.2)  %


 


Mono % (Auto)     (0.0-7.3)  %


 


Eos % (Auto)     (0.0-4.3)  %


 


Eos # (Auto)     (0.0-0.4)  K/mm3


 


PT     (12.2-14.9)  Sec.


 


INR     (0.87-1.13)  


 


D-Dimer     (0-234)  ng/mlDDU


 


Potassium     (3.6-5.0)  mmol/L


 


Chloride     ()  mmol/L


 


Carbon Dioxide     (22-30)  mmol/L


 


BUN     (7-17)  mg/dL


 


Creatinine     (0.6-1.2)  mg/dL


 


Glucose   59 L   ()  mg/dL


 


Lactic Acid  2.70 H*    (0.7-2.0)  mmol/L


 


Ferritin    1434.0 H  (10.0-200.0)  ng/mL


 


ALT     (7-56)  units/L


 


Alkaline Phosphatase     ()  units/L


 


Lactate Dehydrogenase   213 H   ()  units/L


 


Troponin T     (0.00-0.029)  ng/mL


 


C-Reactive Protein   3.90 H   (0.00-1.30)  mg/dL


 


Albumin     (3.9-5)  g/dL


 


Triglycerides     (2-149)  mg/dL


 


Cholesterol     ()  mg/dL


 


HDL Cholesterol     (40-59)  mg/dL


 


TSH     (0.270-4.200)  mlU/mL














  02/28/21 02/28/21 02/28/21 Range/Units





  00:37 00:37 04:36 


 


RBC    3.64 L  (3.65-5.03)  M/mm3


 


RDW    17.2 H  (13.2-15.2)  %


 


Mono % (Auto)    11.6 H  (0.0-7.3)  %


 


Eos % (Auto)    6.1 H  (0.0-4.3)  %


 


Eos # (Auto)     (0.0-0.4)  K/mm3


 


PT     (12.2-14.9)  Sec.


 


INR     (0.87-1.13)  


 


D-Dimer     (0-234)  ng/mlDDU


 


Potassium     (3.6-5.0)  mmol/L


 


Chloride     ()  mmol/L


 


Carbon Dioxide     (22-30)  mmol/L


 


BUN     (7-17)  mg/dL


 


Creatinine     (0.6-1.2)  mg/dL


 


Glucose     ()  mg/dL


 


Lactic Acid  2.40 H*    (0.7-2.0)  mmol/L


 


Ferritin     (10.0-200.0)  ng/mL


 


ALT     (7-56)  units/L


 


Alkaline Phosphatase     ()  units/L


 


Lactate Dehydrogenase     ()  units/L


 


Troponin T     (0.00-0.029)  ng/mL


 


C-Reactive Protein     (0.00-1.30)  mg/dL


 


Albumin     (3.9-5)  g/dL


 


Triglycerides     (2-149)  mg/dL


 


Cholesterol     ()  mg/dL


 


HDL Cholesterol     (40-59)  mg/dL


 


TSH   6.100 H   (0.270-4.200)  mlU/mL














  02/28/21 02/28/21 02/28/21 Range/Units





  04:36 04:36 09:43 


 


RBC     (3.65-5.03)  M/mm3


 


RDW     (13.2-15.2)  %


 


Mono % (Auto)     (0.0-7.3)  %


 


Eos % (Auto)     (0.0-4.3)  %


 


Eos # (Auto)     (0.0-0.4)  K/mm3


 


PT  16.0 H    (12.2-14.9)  Sec.


 


INR  1.28 H    (0.87-1.13)  


 


D-Dimer     (0-234)  ng/mlDDU


 


Potassium   5.9 H   (3.6-5.0)  mmol/L


 


Chloride     ()  mmol/L


 


Carbon Dioxide     (22-30)  mmol/L


 


BUN   84 H   (7-17)  mg/dL


 


Creatinine   14.7 H   (0.6-1.2)  mg/dL


 


Glucose     ()  mg/dL


 


Lactic Acid     (0.7-2.0)  mmol/L


 


Ferritin     (10.0-200.0)  ng/mL


 


ALT     (7-56)  units/L


 


Alkaline Phosphatase     ()  units/L


 


Lactate Dehydrogenase     ()  units/L


 


Troponin T    0.514 H*  (0.00-0.029)  ng/mL


 


C-Reactive Protein     (0.00-1.30)  mg/dL


 


Albumin     (3.9-5)  g/dL


 


Triglycerides     (2-149)  mg/dL


 


Cholesterol     ()  mg/dL


 


HDL Cholesterol     (40-59)  mg/dL


 


TSH     (0.270-4.200)  mlU/mL














Assessment and Plan


Cultures:


Blood cultures 2/27/2021 pending





Assessment: 50 years old female with history of ESRD on hemodialysis, diabetes 

mellitus, CVA, SVT, admitted on 2/27/2021 secondary to altered mental status for

24 hours and right eye edema:





#Sepsis: Present on admission with fever, tachycardia, elevated lactate; unclear

etiology, possible bilateral pneumonia versus preseptal cellulitis.  UA 

negative.





#Bilateral pneumonia versus volume overload: Patient missed hemodialysis sessio

ns x2.  She rule out COVID-19 infection.  Ferritin elevated 1434.  CRP 3.9.





#Right sided proptosis: No erythema seen, conjunctival minimally injected, 

unclear if this is trauma versus preseptal cellulitis.  Facial CT shows right 

preseptal periorbital soft tissue swelling.





#Acute hypoxemic respiratory failure: Currently on 2 L.  Likely secondary to 

pneumonia versus volume overload.





#ESRD: Missed hemodialysis x2.





#Elevated D-dimer: ?  Evaluation for PE/DVT, rule out COVID-19 infection.





#Acute encephalopathy: Unclear etiology.  CT of head unremarkable.  She rule out

meningitis if no other sources are found.





#Right index tenderness: ?  Previous surgery.  X-ray with foreign body.





Recommendations:


-Follow-up blood cultures


-Follow-up SARS-CoV-2 PCR


-Obtain lumbar puncture send CSF for cell count, differential, glucose, protein,

VDRL, cryptococcal antigen


-Ophthalmology evaluation is feasible


-Evaluation for PE and DVT


-Continue cefepime 2 g IV once a day


-Continue vancomycin with PK consult


-Add Flagyl 500 IV every 8 hours





Will follow.





Shelbie Ramirez MD


Infectious Diseases Consultant 


[facility] ([facility])


M [phone]

## 2021-02-28 NOTE — XRAY REPORT
LEFT HAND 4 VIEWS



INDICATION:

pain/trauma Lt index finger.



COMPARISON:

No relevant prior imaging study available.



FINDINGS:

There is diffuse osteopenia. Joint space narrowing is noted. There is erosive/resorptive change at th
e thumb IP joint. These findings could be seen in the setting of renal osteodystrophy or inflammatory
 arthropathy.



There are somewhat ovoid densities within the soft tissues of the proximal index finger which measure
 approximately 1.7 cm in maximum diameter. These are immediately adjacent to the base of the proximal
 phalanx which is abnormal. There appears to be a subacute fracture and sclerosis at the base of the 
proximal phalanx.



IMPRESSION:

1. Rounded densities in the soft tissues at the base of the left index finger consistent with foreign
 bodies. These are of uncertain etiology and could be antibiotic beads. Underlying sclerosis at the b
ase of the index finger could be due to chronic osteomyelitis plus/minus subacute fracture. Correlati
on with patient's surgical history is recommended. If the patient has not had an intervention in this
 region, these could be deposits of soft tissue calcification in the setting of hyperparathyroidism (
secondary).

2. Resorptive change at the thumb IP joint and diffuse osteopenia. These findings could be seen in th
e setting of renal osteodystrophy. Inflammatory arthropathy is also a consideration.



 



Signer Name: Dillon Martinez MD 

Signed: 2/28/2021 11:39 AM

Workstation Name: [id number]

## 2021-03-01 LAB
BUN SERPL-MCNC: 33 MG/DL (ref 7–17)
BUN/CREAT SERPL: 4 %
CALCIUM SERPL-MCNC: 9.5 MG/DL (ref 8.4–10.2)
HEMOLYSIS INDEX: 4

## 2021-03-01 PROCEDURE — 5A1D70Z PERFORMANCE OF URINARY FILTRATION, INTERMITTENT, LESS THAN 6 HOURS PER DAY: ICD-10-PCS | Performed by: INTERNAL MEDICINE

## 2021-03-01 RX ADMIN — Medication PRN ML: at 22:14

## 2021-03-01 RX ADMIN — GABAPENTIN SCH: 100 CAPSULE ORAL at 00:08

## 2021-03-01 RX ADMIN — ASPIRIN SCH: 81 TABLET, CHEWABLE ORAL at 14:22

## 2021-03-01 RX ADMIN — GABAPENTIN SCH: 100 CAPSULE ORAL at 14:22

## 2021-03-01 RX ADMIN — Medication SCH ML: at 14:23

## 2021-03-01 RX ADMIN — INSULIN LISPRO SCH: 100 INJECTION, SOLUTION INTRAVENOUS; SUBCUTANEOUS at 13:46

## 2021-03-01 RX ADMIN — INSULIN LISPRO SCH: 100 INJECTION, SOLUTION INTRAVENOUS; SUBCUTANEOUS at 00:30

## 2021-03-01 RX ADMIN — METRONIDAZOLE SCH MLS/HR: 5 INJECTION, SOLUTION INTRAVENOUS at 05:58

## 2021-03-01 RX ADMIN — INSULIN LISPRO SCH: 100 INJECTION, SOLUTION INTRAVENOUS; SUBCUTANEOUS at 08:29

## 2021-03-01 RX ADMIN — FAMOTIDINE SCH: 20 TABLET ORAL at 14:22

## 2021-03-01 RX ADMIN — METRONIDAZOLE SCH MLS/HR: 5 INJECTION, SOLUTION INTRAVENOUS at 14:09

## 2021-03-01 RX ADMIN — GABAPENTIN SCH MG: 100 CAPSULE ORAL at 22:12

## 2021-03-01 RX ADMIN — CEFEPIME SCH MLS/HR: 1 INJECTION, POWDER, FOR SOLUTION INTRAMUSCULAR; INTRAVENOUS at 22:12

## 2021-03-01 RX ADMIN — METOPROLOL SUCCINATE SCH: 25 TABLET, FILM COATED, EXTENDED RELEASE ORAL at 10:00

## 2021-03-01 RX ADMIN — CLOPIDOGREL BISULFATE SCH: 75 TABLET ORAL at 14:22

## 2021-03-01 RX ADMIN — INSULIN LISPRO SCH: 100 INJECTION, SOLUTION INTRAVENOUS; SUBCUTANEOUS at 16:11

## 2021-03-01 RX ADMIN — CEFEPIME SCH MLS/HR: 1 INJECTION, POWDER, FOR SOLUTION INTRAMUSCULAR; INTRAVENOUS at 00:29

## 2021-03-01 RX ADMIN — Medication SCH ML: at 22:13

## 2021-03-01 RX ADMIN — INSULIN LISPRO SCH: 100 INJECTION, SOLUTION INTRAVENOUS; SUBCUTANEOUS at 22:06

## 2021-03-01 RX ADMIN — Medication SCH ML: at 00:30

## 2021-03-01 RX ADMIN — CINACALCET HYDROCHLORIDE SCH: 30 TABLET, FILM COATED ORAL at 14:22

## 2021-03-01 RX ADMIN — METOPROLOL TARTRATE SCH MG: 50 TABLET, FILM COATED ORAL at 22:12

## 2021-03-01 RX ADMIN — METRONIDAZOLE SCH MLS/HR: 5 INJECTION, SOLUTION INTRAVENOUS at 22:13

## 2021-03-01 NOTE — PROGRESS NOTE
Assessment and Plan


Assessment and plan: 


--Dysphagia/patient refusing to eat 


Current Visit: Yes   Status: Acute   


Plan to address problem: 


Follow speech therapy evaluation


Dobbhoff placement for medications and tube feeding


If needed, continue supportive care





--Acute toxic metabolic encephalopathy


Current Visit: Yes   Status: Acute   


Plan to address problem: 


Multifactorial, uremia, severe hyperkalemia.  


SVT and possible COVID-19


Treat the underlying cause, supportive care


Neurochecks


CT ;head negative for acute abnormality


CT face; periorbital swelling





--Right eye lid and facial swelling:


Current Visit: Yes   Status: Acute . 


Plan to address problem: 


 Slightly improved, continue current management





--Severe sepsis:


Current Visit: Yes   Status: Acute . 


Plan to address problem: 


Fever tachycardia lactic acidosis, altered level of consciousness


ID following, empiric antibiotics cefepime Vanco and metronidazole


Follow cultures





--Hyperkalemia


Current Visit: Yes   Status: Acute .  


Plan to address problem: 


Patient  received insulin and glucose, calcium gluconate and sodium bicarb.


This morning's potassium is 5.6, calcium gluconate 1 dose


Hemodialysis, nephrology consulted





--ESRD on hemodialysis TTS


Current Visit: Yes   Status: Chronic   


Plan to address problem: 


Hemodialysis per schedule


Nephrology consulted





--Noncompliance with HD


Current Visit: Yes   Status: Acute   


Plan to address problem: 


Patient strongly advised to comply with medications diet 


follow-up visit especially hemodialysis per schedule





--SVT (supraventricular tachycardia)


Current Visit: Yes   Status: Acute   


Plan to address problem: 


Patient received adenosine, 


Resumed home medication metoprolol


Cardiology consulted





--Chronic elevation of troponin/NSTEMI 2


Current Visit: Yes   Status: Acute   


Plan to address problem: 


Probably due to ESRD


However patient has multiple risk factors.  


Cardiology consulted





--PUI /negative  COVID-19 test


Current Visit: Yes   Status: Acute   


Plan to address problem: 


DC isolation, corona PCR test negative





--DM2 (diabetes mellitus, type 2)


Current Visit: No   Status: Chronic   


Plan to address problem: 


Accu-Chek sliding scale coverage ADA diet


Insulin as needed





--Hypertensive urgency; POA


Current Visit: No   Status: Chronic.  


Now moderate control, continue current antihypertensives


As needed hydralazine   





--DVT prophylaxis


Current Visit: No   Status: Acute   


Plan to address problem: 


Continue subcu heparin renal dose





--Full code status


Current Visit: Yes   Status: Acute   


Plan to address problem: 


Patient is a full code.





Closely monitor the patient and adjust the management as needed.


Consults and recommendations noted and appreciated


Plan of care reviewed with the patient and patient's nurse








Brief history


50-year-old -American female with known history of end-stage renal 

disease on dialysis, diabetes mellitus, CVA and history of SVT presenting to the

emergency room today via EMS with altered mental status.  Upon arrival in the 

emergency room patient was found to be in SVT and also had a fever.  Temperature

was said to be about 102 F.  Patient received 6 mg of adenosine and 

subsequently 12 mg of adenosine in route to the hospital.  Subsequent rhythm was

said to have been a sinus tachycardia.  She had an initial heart rate of about 

200 which later improved to about 140.


Patient was evaluated by infectious diseases, nephrology, received hemodialysis 

per schedule, patient symptoms slightly improved





3/1; patient is more alert and awake facial swelling slightly improved, on 

empiric antibiotics per ID


Refusing to eat, speech therapy consulted, follow speech and swallow, Dobbhoff 

placement for medications and feeds if no improvement





History


Interval history: 


I have seen and examined the patient at the bedside


Patient's chart and medications reviewed


Patient received hemodialysis today


More alert and awake, minimally communicative


Refusing to take oral


Pending speech therapy evaluation





Hospitalist Physical





- Constitutional


Vitals: 


                                        











Temp Pulse Resp BP Pulse Ox


 


 98.8 F   107 H  18   145/75   100 


 


 03/01/21 04:21  03/01/21 07:00  03/01/21 05:26  03/01/21 08:10  03/01/21 04:21











General appearance: Present: mild distress, well-nourished, other (Uncooperative

noncommunicative)





- EENT


Eyes: Present: PERRL, EOM intact





- Neck


Neck: Present: supple, normal ROM





- Respiratory


Respiratory effort: normal


Respiratory: bilateral: diminished, rales, negative: rhonchi, wheezing





- Cardiovascular


Rhythm: regular


Heart Sounds: Present: S1 & S2





- Extremities


Extremities: no ischemia, No edema





- Abdominal


General gastrointestinal: soft, non-tender, non-distended, normal bowel sounds





- Integumentary


Integumentary: Present: clear, warm





- Psychiatric


Psychiatric: cooperative, other (Lethargic minimally communicative)





- Neurologic


Neurologic: moves all extremities





HEART Score





- HEART Score


Troponin: 


                                        











Troponin T  0.514 ng/mL (0.00-0.029)  H*  02/28/21  09:43    














Results





- Labs


CBC & Chem 7: 


                                 02/28/21 04:36





                                 03/01/21 04:28


Labs: 


                             Laboratory Last Values











WBC  6.8 K/mm3 (4.5-11.0)   02/28/21  04:36    


 


RBC  3.64 M/mm3 (3.65-5.03)  L  02/28/21  04:36    


 


Hgb  10.8 gm/dl (10.1-14.3)   02/28/21  04:36    


 


Hct  33.6 % (30.3-42.9)   02/28/21  04:36    


 


MCV  92 fl (79-97)   02/28/21  04:36    


 


MCH  30 pg (28-32)   02/28/21  04:36    


 


MCHC  32 % (30-34)   02/28/21  04:36    


 


RDW  17.2 % (13.2-15.2)  H  02/28/21  04:36    


 


Plt Count  218 K/mm3 (140-440)   02/28/21  04:36    


 


Lymph % (Auto)  19.7 % (13.4-35.0)   02/28/21  04:36    


 


Mono % (Auto)  11.6 % (0.0-7.3)  H  02/28/21  04:36    


 


Eos % (Auto)  6.1 % (0.0-4.3)  H  02/28/21  04:36    


 


Baso % (Auto)  0.9 % (0.0-1.8)   02/28/21  04:36    


 


Lymph # (Auto)  1.3 K/mm3 (1.2-5.4)   02/28/21  04:36    


 


Mono # (Auto)  0.8 K/mm3 (0.0-0.8)   02/28/21  04:36    


 


Eos # (Auto)  0.4 K/mm3 (0.0-0.4)   02/28/21  04:36    


 


Baso # (Auto)  0.1 K/mm3 (0.0-0.1)   02/28/21  04:36    


 


Seg Neutrophils %  61.7 % (40.0-70.0)   02/28/21  04:36    


 


Seg Neutrophils #  4.2 K/mm3 (1.8-7.7)   02/28/21  04:36    


 


PT  16.0 Sec. (12.2-14.9)  H  02/28/21  04:36    


 


INR  1.28  (0.87-1.13)  H  02/28/21  04:36    


 


D-Dimer  2202.99 ng/mlDDU (0-234)  H  02/27/21  19:51    


 


Sodium  141 mmol/L (137-145)   03/01/21  04:28    


 


Potassium  4.2 mmol/L (3.6-5.0)  D 03/01/21  04:28    


 


Chloride  99.2 mmol/L ()   03/01/21  04:28    


 


Carbon Dioxide  27 mmol/L (22-30)   03/01/21  04:28    


 


Anion Gap  19 mmol/L  03/01/21  04:28    


 


BUN  33 mg/dL (7-17)  H  03/01/21  04:28    


 


Creatinine  7.7 mg/dL (0.6-1.2)  H  03/01/21  04:28    


 


Estimated GFR  7 ml/min  03/01/21  04:28    


 


BUN/Creatinine Ratio  4 %  03/01/21  04:28    


 


Glucose  91 mg/dL ()   03/01/21  04:28    


 


POC Glucose  88 mg/dL ()   03/01/21  07:42    


 


Lactic Acid  1.70 mmol/L (0.7-2.0)   02/28/21  04:36    


 


Calcium  9.5 mg/dL (8.4-10.2)   03/01/21  04:28    


 


Phosphorus  6.80 mg/dL (2.5-4.5)  H  03/01/21  04:28    


 


Ferritin  1434.0 ng/mL (10.0-200.0)  H  02/27/21  19:51    


 


Total Bilirubin  0.80 mg/dL (0.1-1.2)   02/27/21  19:51    


 


AST  10 units/L (5-40)   02/27/21  19:51    


 


ALT  < 5 units/L (7-56)  L  02/27/21  19:51    


 


Alkaline Phosphatase  500 units/L ()  H  02/27/21  19:51    


 


Lactate Dehydrogenase  213 units/L ()  H  02/27/21  19:51    


 


Total Creatine Kinase  92 units/L ()   02/28/21  09:43    


 


CK-MB (CK-2)  2.2 ng/mL (0.0-4.0)   02/28/21  09:43    


 


CK-MB (CK-2) Rel Index  2.3  (0-4)   02/28/21  09:43    


 


Troponin T  0.514 ng/mL (0.00-0.029)  H*  02/28/21  09:43    


 


C-Reactive Protein  3.90 mg/dL (0.00-1.30)  H  02/27/21  19:51    


 


Total Protein  6.8 g/dL (6.3-8.2)   02/27/21  19:51    


 


Albumin  3.0 g/dL (3.9-5)  L  02/27/21  19:51    


 


Albumin/Globulin Ratio  0.8 %  02/27/21  19:51    


 


Triglycerides  236 mg/dL (2-149)  H  02/27/21  19:51    


 


Cholesterol  202 mg/dL ()  H  02/27/21  19:51    


 


LDL Cholesterol Direct  114 mg/dL ()   02/27/21  19:51    


 


HDL Cholesterol  34 mg/dL (40-59)  L  02/27/21  19:51    


 


Cholesterol/HDL Ratio  5.94 %  02/27/21  19:51    


 


Procalcitonin  0.39 ng/mL (<0.15)   02/27/21  19:51    


 


TSH  6.100 mlU/mL (0.270-4.200)  H  02/28/21  00:37    


 


Free T4  0.85 ng/dL (0.76-1.46)   02/28/21  00:37    


 


Urine Color  Yellow  (Yellow)   02/27/21  20:42    


 


Urine Turbidity  Clear  (Clear)   02/27/21  20:42    


 


Urine pH  7.0  (5.0-7.0)   02/27/21  20:42    


 


Ur Specific Gravity  1.009  (1.003-1.030)   02/27/21  20:42    


 


Urine Protein  100 mg/dl mg/dL (Negative)   02/27/21  20:42    


 


Urine Glucose (UA)  Neg mg/dL (Negative)   02/27/21  20:42    


 


Urine Ketones  Neg mg/dL (Negative)   02/27/21  20:42    


 


Urine Blood  Neg  (Negative)   02/27/21  20:42    


 


Urine Nitrite  Neg  (Negative)   02/27/21  20:42    


 


Urine Bilirubin  Neg  (Negative)   02/27/21  20:42    


 


Urine Urobilinogen  < 2.0 mg/dL (<2.0)   02/27/21  20:42    


 


Ur Leukocyte Esterase  Neg  (Negative)   02/27/21  20:42    


 


Urine WBC (Auto)  1.0 /HPF (0.0-6.0)   02/27/21  20:42    


 


Urine RBC (Auto)  < 1.0 /HPF (0.0-6.0)   02/27/21  20:42    


 


Random Vancomycin  12 ug/mL (0-40.0)   03/01/21  07:41    


 


Coronavirus (PCR)  Negative  (Negative)   02/28/21  08:53    


 


Hepatitis A IgM Ab  Non-reactive  (NonReactive)   02/28/21  09:43    


 


Hep Bs Antigen  Non-reactive  (Negative)   02/28/21  09:43    


 


Hep B Core IgM Ab  Non-reactive  (NonReactive)   02/28/21  09:43    


 


Hepatitis C Antibody  Non-reactive  (NonReactive)   02/28/21  09:43    











Microbiology: 


Microbiology





02/27/21 19:51   Peripheral/Venous   Blood Culture - Preliminary


                            NO GROWTH AFTER 24 HOURS


02/27/21 19:51   Peripheral/Venous   Blood Culture - Preliminary


                            NO GROWTH AFTER 24 HOURS








Portillo/IV: 


                                        





Voiding Method                   Diaper











Active Medications





- Current Medications


Current Medications: 














Generic Name Dose Route Start Last Admin





  Trade Name Freq  PRN Reason Stop Dose Admin


 


Acetaminophen  650 mg  02/27/21 22:22 





  Acetaminophen 325 Mg Tab  PO  





  Q4H PRN  





  Pain MILD(1-3)/Fever >100.5/HA  


 


Aspirin  81 mg  02/28/21 10:00  02/28/21 09:15





  Aspirin 81 Mg Tab Chew  PO   81 mg





  QDAY THU   Administration


 


Atorvastatin Calcium  20 mg  02/28/21 22:00  03/01/21 00:09





  Atorvastatin 20 Mg Tab  PO   Not Given





  QHS THU  


 


Cinacalcet  30 mg  02/28/21 10:00  02/28/21 09:15





  Cinacalcet 30 Mg Tab  PO   30 mg





  QDAY THU   Administration


 


Clonidine HCl  0.1 mg  02/28/21 10:00  02/28/21 09:16





  Clonidine 0.1 Mg Tab  PO   0.1 mg





  QDAY THU   Administration


 


Clopidogrel Bisulfate  75 mg  02/28/21 10:00  02/28/21 09:15





  Clopidogrel 75 Mg Tab  PO   75 mg





  QDAY THU   Administration


 


Dextrose  50 ml  02/27/21 22:22  02/28/21 23:40





  Dextrose 50% In Water (25gm) 50 Ml Syringe  IV   50 ml





  Q30MIN PRN   Administration





  Hypoglycemia  





  Protocol  


 


Famotidine  20 mg  02/28/21 10:00  02/28/21 09:15





  Famotidine 20 Mg Tab  PO   20 mg





  QDAY THU   Administration


 


Gabapentin  100 mg  02/28/21 10:00  03/01/21 00:08





  Gabapentin 100 Mg Cap  PO   Not Given





  BID THU  


 


Cefepime HCl  1 gm in 100 mls @ 200 mls/hr  02/28/21 20:00  03/01/21 00:29





  Cefepime/Ns 1 Gm/100 Ml  IV   200 mls/hr





  Q24H THU   Administration





  Protocol  


 


Metronidazole  500 mg in 100 mls @ 100 mls/hr  02/28/21 13:00  03/01/21 05:58





  Flagyl 500 Mg/100 Ml  IV   100 mls/hr





  Q8H THU   Administration





  Protocol  


 


Insulin Human Lispro  0 unit  02/28/21 07:30  03/01/21 08:29





  Insulin Lispro 100 Unit/Ml  SUB-Q   Not Given





  [facility]  





  Protocol  


 


Isosorbide Mononitrate  30 mg  02/28/21 10:00  02/28/21 09:15





  Isosorbide Mononitrate Er 30 Mg Tab  PO   30 mg





  QDAY THU   Administration


 


Magnesium Hydroxide  30 ml  02/27/21 22:22 





  Magnesium Hydroxide (Mom) Oral Liqd Udc  PO  





  Q4H PRN  





  Constipation  


 


Metoprolol Succinate  25 mg  02/28/21 10:00  02/28/21 09:16





  Metoprolol Succinate Xl 25 Mg Tab  PO   25 mg





  QDAY THU   Administration


 


Morphine Sulfate  2 mg  02/27/21 22:22 





  Morphine 2 Mg/1 Ml Inj  IV  





  Q4H PRN  





  Pain, Moderate (4-6)  


 


Ondansetron HCl  4 mg  02/27/21 22:22 





  Ondansetron 4 Mg/2 Ml Inj  IV  





  Q8H PRN  





  Nausea And Vomiting  


 


Sodium Chloride  10 ml  02/28/21 10:00  03/01/21 00:30





  Sodium Chloride 0.9% 10 Ml Flush Syringe  IV   10 ml





  BID THU   Administration


 


Sodium Chloride  10 ml  02/27/21 22:22 





  Sodium Chloride 0.9% 10 Ml Flush Syringe  IV  





  PRN PRN  





  LINE FLUSH  














Nutrition/Malnutrition Assess





- Dietary Evaluation


Nutrition/Malnutrition Findings: 


                                        





Nutrition Notes                                            Start:  02/28/21 

08:42


Freq:                                                      Status: Active       




Protocol:                                                                       




 Document     02/28/21 08:42  TAMARA  (Rec: 02/28/21 08:45  TAMARA  [id number])


 Nutrition Notes


     Need for Assessment generated from:         MD Order


     Initial or Follow up                        Brief Note


     Current Diagnosis                           CKD (stage V CKD),Diabetes,


                                                 Hypertension,Stroke


     Other Pertinent Diagnosis                   on HD, AMS


     Current Diet                                Cardiac, consistent CHO


     Subjective/Other Information                MD order for diet education.


                                                 Per chart, pt is not


                                                 appropriate for diet education


                                                 due to mental status. Pt is


                                                 bed bound with no wounds noted


                                                 .


 Nutrition Intervention


     Change Diet Order:                          Add renal


     Revisit per MD consult or patient           Sign Off


      request:

## 2021-03-01 NOTE — XRAY REPORT
CHEST 1 VIEW 



INDICATION:  for vq scan.



COMPARISON:  2/27/2021



FINDINGS:

Support devices: None. 



Heart: Within normal limits. 

Lungs/Pleura: No acute air space or interstitial disease. 



Additional findings: None.



IMPRESSION:

 No acute findings.



Signer Name: Aamir Moser Jr, MD 

Signed: 3/1/2021 8:07 AM

Workstation Name: [id number]

## 2021-03-01 NOTE — CONSULTATION
History of Present Illness


Consult date: 03/01/21


Requesting physician: ANA KHALIL


Consult reason: tachycardia


History of present illness: 





This patient is a 50 year old female with a hx of ESRD on HD, HTN, DM, CVA, and 

PSVT. History obtained from chart due to patient with altered mental status. 

This patient has been previously seen by our practice during prior admissions. 

She presents to [facility] ER in SVT with AMS, Hyperkalemia. Patient was converted 

with Adenosine (6mg>12mg) to sinus rhythm. She has reportedly missed her last 2 

dialysis appointments. 


Tele reviewed: Sinus Tach, , High  PSVT <6sec run overnight.


Initial ECG shows SVT, . Most recent ECG shows Sinus Tach, .


Echo 02/27/2021 Reviewed: EF 60-65%. Severe left ventricular hypertrophy. Vent 

Diastolic filling pattern is restrictive, elevated mean left atrial pressure. LA

severely dilated. RV mildly dilated. RV global systolic function mildly reduced.

RA mildly dilated.  Trace AR, Mild MR, Trace TR.


Lexiscan MPI Stress Test 03/2020 negative. Pt returned to [facility] in Oct 2020 with 

complaints of recurrent CP and was recommended for LHC at that time which she 

ultimately declined. 











Past History


Past Medical History: diabetes, dialysis, ESRD, hypertension, stroke


Past Surgical History: Other (Left arm A-V fistula, Gastric Bypass.)


Social history: no significant social history


Family history: no significant family history





Medications and Allergies


                                    Allergies











Allergy/AdvReac Type Severity Reaction Status Date / Time


 


diphenhydramine Allergy  Unknown Verified 08/05/19 02:48





[From Benadryl]     


 


lisinopril Allergy  Unknown Verified 08/05/19 02:48


 


seafood Allergy  Angioedema Uncoded 04/26/20 11:58











                                Home Medications











 Medication  Instructions  Recorded  Confirmed  Last Taken  Type


 


Gabapentin 100 mg PO BID 03/14/20 10/17/20 03/08/20 History


 


traMADoL [Ultram 50 MG tab] 50 mg PO PRN 03/14/20 10/17/20 Unknown History


 


Cinacalcet HCl [Sensipar] 30 mg PO QDAY #30 04/26/20 10/17/20 Unknown Rx


 


Epoetin Jayy 10,000 Unit [Procrit] 10,000 unit IV SAVANNAH PRN #1 vial 04/26/20 10/

17/20 Unknown Rx


 


Famotidine [Pepcid] 20 mg PO QDAY #30 04/26/20 10/17/20 Unknown Rx


 


cloNIDine 0.1 mg PO QDAY #30 04/26/20 10/17/20 03/08/20 Rx


 


methylPREDNISolone [Medrol 4MG 1 tab PO QDAY #1 tab.ds.pk 04/26/20 10/17/20 

Unknown Rx





DOSEPAK (21 tabs)]     


 


oxyCODONE /ACETAMINOPHEN [Percocet 1 tab PO Q4H PRN #15 04/26/20 10/17/20 

Unknown Rx





5/325 mg]     


 


Aspirin [Aspirin BABY CHEW TAB] 81 mg PO QDAY #30 tab.chew 10/19/20  Unknown Rx


 


AtorvaSTATin [Lipitor] 20 mg PO QHS #30 tablet 10/19/20  Unknown Rx


 


Clopidogrel [Plavix] 75 mg PO QDAY #30 tablet 10/19/20  Unknown Rx


 


ISOSORBIDE MONOnitrate [Imdur ER] 30 mg PO QDAY #30 tablet 10/19/20  Unknown Rx


 


Metoprolol Xl [Metoprolol 25 mg PO QDAY #30 tablet 10/19/20  Unknown Rx





SUCCINATE ER TAB]     


 


oxyCODONE /ACETAMINOPHEN [Percocet 1 tab PO TID PRN #10 tablet 10/19/20  Unknown

 Rx





5/325]     











Active Meds: 


Active Medications





Acetaminophen (Acetaminophen 325 Mg Tab)  650 mg PO Q4H PRN


   PRN Reason: Pain MILD(1-3)/Fever >100.5/HA


Aspirin (Aspirin 81 Mg Tab Chew)  81 mg PO QDAY [facility]


   Last Admin: 02/28/21 09:15 Dose:  81 mg


   Documented by: 


Atorvastatin Calcium (Atorvastatin 20 Mg Tab)  20 mg PO QHS [facility]


   Last Admin: 03/01/21 00:09 Dose:  Not Given


   Documented by: 


Cinacalcet (Cinacalcet 30 Mg Tab)  30 mg PO QDAY [facility]


   Last Admin: 02/28/21 09:15 Dose:  30 mg


   Documented by: 


Clonidine HCl (Clonidine 0.1 Mg Tab)  0.1 mg PO QDAY [facility]


   Last Admin: 02/28/21 09:16 Dose:  0.1 mg


   Documented by: 


Clopidogrel Bisulfate (Clopidogrel 75 Mg Tab)  75 mg PO QDAY [facility]


   Last Admin: 02/28/21 09:15 Dose:  75 mg


   Documented by: 


Dextrose (Dextrose 50% In Water (25gm) 50 Ml Syringe)  50 ml IV Q30MIN PRN; 

Protocol


   PRN Reason: Hypoglycemia


   Last Admin: 02/28/21 23:40 Dose:  50 ml


   Documented by: 


Famotidine (Famotidine 20 Mg Tab)  20 mg PO QDAY [facility]


   Last Admin: 02/28/21 09:15 Dose:  20 mg


   Documented by: 


Gabapentin (Gabapentin 100 Mg Cap)  100 mg PO BID [facility]


   Last Admin: 03/01/21 00:08 Dose:  Not Given


   Documented by: 


Cefepime HCl (Cefepime/Ns 1 Gm/100 Ml)  1 gm in 100 mls @ 200 mls/hr IV Q24H 

[facility]; Protocol


   Last Admin: 03/01/21 00:29 Dose:  200 mls/hr


   Documented by: 


Metronidazole (Flagyl 500 Mg/100 Ml)  500 mg in 100 mls @ 100 mls/hr IV Q8H [facility];

Protocol


   Last Admin: 03/01/21 05:58 Dose:  100 mls/hr


   Documented by: 


Vancomycin HCl (Vancomycin/Ns 1 Gm/250 Ml)  1 gm in 250 mls @ 167.007 mls/hr IV 

ONCE ONE


   Stop: 03/01/21 23:29


Insulin Human Lispro (Insulin Lispro 100 Unit/Ml)  0 unit SUB-Q ACHS [facility]; 

Protocol


   Last Admin: 03/01/21 08:29 Dose:  Not Given


   Documented by: 


Isosorbide Mononitrate (Isosorbide Mononitrate Er 30 Mg Tab)  30 mg PO QDAY [facility]


   Last Admin: 02/28/21 09:15 Dose:  30 mg


   Documented by: 


Magnesium Hydroxide (Magnesium Hydroxide (Mom) Oral Liqd Udc)  30 ml PO Q4H PRN


   PRN Reason: Constipation


Metoprolol Succinate (Metoprolol Succinate Xl 25 Mg Tab)  25 mg PO QDAY [facility]


   Last Admin: 02/28/21 09:16 Dose:  25 mg


   Documented by: 


Morphine Sulfate (Morphine 2 Mg/1 Ml Inj)  2 mg IV Q4H PRN


   PRN Reason: Pain, Moderate (4-6)


Ondansetron HCl (Ondansetron 4 Mg/2 Ml Inj)  4 mg IV Q8H PRN


   PRN Reason: Nausea And Vomiting


Sodium Chloride (Sodium Chloride 0.9% 10 Ml Flush Syringe)  10 ml IV BID [facility]


   Last Admin: 03/01/21 00:30 Dose:  10 ml


   Documented by: 


Sodium Chloride (Sodium Chloride 0.9% 10 Ml Flush Syringe)  10 ml IV PRN PRN


   PRN Reason: LINE FLUSH











Review of Systems


ROS unobtainable: due to mental status





Physical Examination


                                        





                                Last Vital Signs











Temp  98.7 F   03/01/21 09:55


 


Pulse  116 H  03/01/21 11:15


 


Resp  16   03/01/21 09:55


 


BP  169/88   03/01/21 11:15


 


Pulse Ox  100   03/01/21 04:21














General appearance: other (Patient is obtunded, some response to voice. Per RN, 

this is unchanged this admission.)


Cardiac: Positive: Reg Rate and Rhythm, S1/S2


Lungs: Positive: Decreased Breath Sounds


Abdomen: Positive: Unremarkable


Skin: Negative: Rash, Wound


Extremities: Present: upper extr. pulses, lower extr. pulses.  Absent: edema





Results





                                 02/28/21 04:36





                                 03/01/21 04:28


                          Comprehensive Metabolic Panel











  03/01/21 Range/Units





  04:28 


 


Sodium  141  (137-145)  mmol/L


 


Potassium  4.2  D  (3.6-5.0)  mmol/L


 


Chloride  99.2  ()  mmol/L


 


Carbon Dioxide  27  (22-30)  mmol/L


 


BUN  33 H  (7-17)  mg/dL


 


Creatinine  7.7 H  (0.6-1.2)  mg/dL


 


Glucose  91  ()  mg/dL


 


Calcium  9.5  (8.4-10.2)  mg/dL














- Imaging and Cardiology


Echo: report reviewed (Echo Reviewed: EF 60-65%. Severe left ventricular 

hypertrophy. Vent Diastolic filling pattern is restrictive, elevated mean left 

atrial pressure. LA severely dilated. RV mildly dilated. RV global systolic 

function mildly reduced. RA mildly dilated.  Trace AR, Mild MR, Trace TR.)


EKG: report reviewed, image reviewed





EKG interpretations





- Telemetry


EKG Rhythm: Sinus Tachycardia


Additional Comments: 





SVT





Assessment and Plan





Pt presented with AMS, Hyperkalemia, missed dialysis. She was noted to be in SVT

and was converted to Sinus RHythm with IV Adenosine. She is known to have PSVT. 

Continue volume and electrolyte management per nephrology. Increase BB as 

tolerated. Continue to monitor on telemetry. 


CE elevation appears consistent with NSTEMI II. Continue to trend CE's and trend

ECG in AM.  


Lexiscan MPI Stress Test 03/2020 negative. Pt returned to [facility] in Oct 2020 with 

complaints of recurrent CP and was recommended for C at that time which she 

ultimately declined. 


Echo 02/27/2021 Reviewed: EF 60-65%. Severe left ventricular hypertrophy. Vent 

Diastolic filling pattern is restrictive, elevated mean left atrial pressure. LA

severely dilated. RV mildly dilated. RV global systolic function mildly reduced.

RA mildly dilated.  Trace AR, Mild MR, Trace TR.


Elevated D dimer. V/Q scan very low probability for PTE.


Will Follow.





This patient was seen in conjunction with Dr Chaevz, who agrees with this 

assessment and plan of care. 

















- Patient Problems


(1) PSVT (paroxysmal supraventricular tachycardia)


Current Visit: Yes   Status: Acute   





(2) AMS (altered mental status)


Current Visit: Yes   Status: Acute   





(3) ESRD (end stage renal disease)


Current Visit: Yes   Status: Chronic   





(4) Missed dialysis


Current Visit: Yes   Status: Acute   





(5) Hyperkalemia


Current Visit: Yes   Status: Acute   





(6) NSTEMI (non-ST elevated myocardial infarction)


Current Visit: Yes   Status: Acute   


Plan to address problem: 


Suspect Type II








(7) DM2 (diabetes mellitus, type 2)


Current Visit: Yes   Status: Chronic   





(8) HTN (hypertension)


Current Visit: Yes   Status: Chronic   


Qualifiers: 


   Hypertension type: essential hypertension   Qualified Code(s): I10 - 

Essential (primary) hypertension   





(9) History of CVA (cerebrovascular accident)


Current Visit: Yes   Status: Chronic

## 2021-03-01 NOTE — PROGRESS NOTE
Assessment and Plan





Cultures:


2/27/2021 blood culture: No growth


2/28/2021 COVID-19 PCR: Negative


Hepatitis panel: Nonreactive





Assessment: 50 years old female with history of ESRD on hemodialysis, diabetes 

mellitus, CVA, SVT, admitted on 2/27/2021 secondary to altered mental status for

24 hours and right eye edema:





#Sepsis: Present on admission with fever, tachycardia, elevated lactate; unclear

etiology, possible bilateral pneumonia versus preseptal cellulitis.  UA 

negative.





#Bilateral pneumonia v/s  volume overload: Patient had missed hemodialysis 

sessions x2.  COVID-19 PCR negative





#Right sided mild proptosis: No erythema seen, conjunctival minimally injected, 

unclear if this is trauma versus preseptal cellulitis.  Facial CT shows right 

preseptal periorbital soft tissue swelling. Seems to be better





#Acute hypoxemic respiratory failure: Currently on 2 L.  Likely secondary to 

pneumonia versus volume overload.





#ESRD: Missed hemodialysis x 2.





#Elevated D-dimer: VQ negative for PE.





#Acute encephalopathy: Likely metabolic/uremic.  CT of head unremarkable.  





#Right index tenderness: ?Previous surgery.  X-ray with foreign body.





Recommendations:


-Continue cefepime, flagyl, vancomycin for now


-Given improving mental status with dialysis, can hold off on LP





Melchor Blake MD, FACP


[facility] ([facility])


O: [phone]


F: [phone]





Subjective


Date of service: 03/01/21


Principal diagnosis: ESRD on HD


Interval history: 





Patient is awake, afebrile, answers some questions but still appears confused. 

No fever.





Objective





- Exam


Narrative Exam: 





Physical Exam: 


Constitutional: Awake but confused


Head, Ears, Nose: Normocephalic, atraumatic. External ears, nose normal


Eyes: Conjunctivae/corneas clear. No icterus. No ptosis.


Neck: Supple, no meningeal signs


Cardiovascular: S1, S2 normal. 


Respiratory: Good air entry, clear to auscultation bilaterally


GI: Soft, non-tender; bowel sounds normal. No peritoneal signs


Musculoskeletal: No pedal edema, no cyanosis.


Skin: No rash or abscess


Hem/Lymphatic: No palpable cervical or supraclavicular nodes. No lymphangitis


Psych: No agitation


Neurological: Awake, confused.





- Constitutional


Vitals: 


                                   Vital Signs











Temp Pulse Resp BP Pulse Ox


 


 98.7 F   116 H  16   169/88   100 


 


 03/01/21 09:55  03/01/21 11:15  03/01/21 09:55  03/01/21 11:15  03/01/21 04:21








                           Temperature -Last 24 Hours











Temperature                    98.7 F


 


Temperature                    98.8 F


 


Temperature                    98.2 F

















- Labs


CBC & Chem 7: 


                                 02/28/21 04:36





                                 03/01/21 04:28


Labs: 


                              Abnormal lab results











  02/28/21 03/01/21 Range/Units





  15:52 04:28 


 


BUN   33 H  (7-17)  mg/dL


 


Creatinine   7.7 H  (0.6-1.2)  mg/dL


 


POC Glucose  110 H   ()  mg/dL


 


Phosphorus   6.80 H  (2.5-4.5)  mg/dL

## 2021-03-01 NOTE — NUCLEAR MEDICINE REPORT
NUCLEAR MEDICINE PERFUSION SCAN



INDICATION: Elevated D-dimers/evaluate for PE



CORRELATION: AP chest performed earlier the same day



RADIOPHARMACEUTICAL: 

Perfusion: 5.0 mCi Tc-99m MAA given IV



FINDINGS:





Perfusion images show symmetric and uniform radiotracer distribution throughout bilateral lung zones.
 Normal cardiac silhouette.





IMPRESSION:



Very low probability perfusion scan for pulmonary embolism.



Signer Name: Aamir Moser Jr, MD 

Signed: 3/1/2021 8:38 AM

Workstation Name: [id number]

## 2021-03-01 NOTE — PROGRESS NOTE
Assessment and Plan


(Principal problem)-Altered mental Status


Hyperkalemia


SVT


End Stage Renal Disease


Hypertension


Anemia


COVID-19 R/O








Plan: 


-Hemodialysis again today for UF and clearance and hyperkalemia


-Fluid restriction of 1 liter per day


-Low potassium diet


-Monitor I/O's


-Obtain daily weights


-Assess dialysis needs daily





Mitch Baker MD


[phone]








Subjective


Date of service: 03/01/21


Principal diagnosis: ESRD on HD


Interval history: 


dialysis was done yesterday without complications








Objective





- Vital Signs


Vital signs: 


                               Vital Signs - 12hr











  03/01/21 03/01/21 03/01/21





  00:10 03:25 04:21


 


Temperature   98.8 F


 


Pulse Rate 117 H 105 H 107 H


 


Respiratory   14





Rate   


 


Blood Pressure   164/98


 


Blood Pressure   





[Right]   


 


O2 Sat by Pulse   100





Oximetry   














  03/01/21 03/01/21





  05:26 08:10


 


Temperature  


 


Pulse Rate 107 H 


 


Respiratory 18 





Rate  


 


Blood Pressure  


 


Blood Pressure 129/77 145/75





[Right]  


 


O2 Sat by Pulse  





Oximetry  














- General Appearance


General appearance: well-nourished, appears stated age


EENT: ATNC


Neck: no JVD


Respiratory: Present: Clear to Ascultation.  Absent: Rales, Ronchi


Cardiology: regular, tachycardia


Gastrointestinal: normoactive bowel sounds


Integumentary: no rash, warm and dry


Neurologic: other (confused)


Musculoskeletal: other (trace edema in BLE)


Psychiatric: other (confused)





- Lab





                                 02/28/21 04:36





                                 03/01/21 04:28


                             Most recent lab results











Calcium  9.5 mg/dL (8.4-10.2)   03/01/21  04:28    


 


Phosphorus  6.80 mg/dL (2.5-4.5)  H  03/01/21  04:28    














Medications & Allergies





- Medications


Allergies/Adverse Reactions: 


                                    Allergies





diphenhydramine [From Benadryl] Allergy (Verified 08/05/19 02:48)


   Unknown


lisinopril Allergy (Verified 08/05/19 02:48)


   Unknown


seafood Allergy (Uncoded 04/26/20 11:58)


   Angioedema








Home Medications: 


                                Home Medications











 Medication  Instructions  Recorded  Confirmed  Last Taken  Type


 


Gabapentin 100 mg PO BID 03/14/20 10/17/20 03/08/20 History


 


traMADoL [Ultram 50 MG tab] 50 mg PO PRN 03/14/20 10/17/20 Unknown History


 


Cinacalcet HCl [Sensipar] 30 mg PO QDAY #30 04/26/20 10/17/20 Unknown Rx


 


Epoetin Jayy 10,000 Unit [Procrit] 10,000 unit IV SAVANNAH PRN #1 vial 04/26/20 

10/17/20 Unknown Rx


 


Famotidine [Pepcid] 20 mg PO QDAY #30 04/26/20 10/17/20 Unknown Rx


 


cloNIDine 0.1 mg PO QDAY #30 04/26/20 10/17/20 03/08/20 Rx


 


methylPREDNISolone [Medrol 4MG 1 tab PO QDAY #1 tab.ds.pk 04/26/20 10/17/20 

Unknown Rx





DOSEPAK (21 tabs)]     


 


oxyCODONE /ACETAMINOPHEN [Percocet 1 tab PO Q4H PRN #15 04/26/20 10/17/20 

Unknown Rx





5/325 mg]     


 


Aspirin [Aspirin BABY CHEW TAB] 81 mg PO QDAY #30 tab.chew 10/19/20  Unknown Rx


 


AtorvaSTATin [Lipitor] 20 mg PO QHS #30 tablet 10/19/20  Unknown Rx


 


Clopidogrel [Plavix] 75 mg PO QDAY #30 tablet 10/19/20  Unknown Rx


 


ISOSORBIDE MONOnitrate [Imdur ER] 30 mg PO QDAY #30 tablet 10/19/20  Unknown Rx


 


Metoprolol Xl [Metoprolol 25 mg PO QDAY #30 tablet 10/19/20  Unknown Rx





SUCCINATE ER TAB]     


 


oxyCODONE /ACETAMINOPHEN [Percocet 1 tab PO TID PRN #10 tablet 10/19/20  Unknown

 Rx





5/325]     











Active Medications: 














Generic Name Dose Route Start Last Admin





  Trade Name Freq  PRN Reason Stop Dose Admin


 


Acetaminophen  650 mg  02/27/21 22:22 





  Acetaminophen 325 Mg Tab  PO  





  Q4H PRN  





  Pain MILD(1-3)/Fever >100.5/HA  


 


Aspirin  81 mg  02/28/21 10:00  02/28/21 09:15





  Aspirin 81 Mg Tab Chew  PO   81 mg





  QDAY THU   Administration


 


Atorvastatin Calcium  20 mg  02/28/21 22:00  03/01/21 00:09





  Atorvastatin 20 Mg Tab  PO   Not Given





  QHS THU  


 


Cinacalcet  30 mg  02/28/21 10:00  02/28/21 09:15





  Cinacalcet 30 Mg Tab  PO   30 mg





  QDAY THU   Administration


 


Clonidine HCl  0.1 mg  02/28/21 10:00  02/28/21 09:16





  Clonidine 0.1 Mg Tab  PO   0.1 mg





  QDAY THU   Administration


 


Clopidogrel Bisulfate  75 mg  02/28/21 10:00  02/28/21 09:15





  Clopidogrel 75 Mg Tab  PO   75 mg





  QDAY THU   Administration


 


Dextrose  50 ml  02/27/21 22:22  02/28/21 23:40





  Dextrose 50% In Water (25gm) 50 Ml Syringe  IV   50 ml





  Q30MIN PRN   Administration





  Hypoglycemia  





  Protocol  


 


Famotidine  20 mg  02/28/21 10:00  02/28/21 09:15





  Famotidine 20 Mg Tab  PO   20 mg





  QDAY THU   Administration


 


Gabapentin  100 mg  02/28/21 10:00  03/01/21 00:08





  Gabapentin 100 Mg Cap  PO   Not Given





  BID THU  


 


Cefepime HCl  1 gm in 100 mls @ 200 mls/hr  02/28/21 20:00  03/01/21 00:29





  Cefepime/Ns 1 Gm/100 Ml  IV   200 mls/hr





  Q24H THU   Administration





  Protocol  


 


Metronidazole  500 mg in 100 mls @ 100 mls/hr  02/28/21 13:00  03/01/21 05:58





  Flagyl 500 Mg/100 Ml  IV   100 mls/hr





  Q8H THU   Administration





  Protocol  


 


Insulin Human Lispro  0 unit  02/28/21 07:30  03/01/21 08:29





  Insulin Lispro 100 Unit/Ml  SUB-Q   Not Given





  ACHS THU  





  Protocol  


 


Isosorbide Mononitrate  30 mg  02/28/21 10:00  02/28/21 09:15





  Isosorbide Mononitrate Er 30 Mg Tab  PO   30 mg





  QDAY THU   Administration


 


Magnesium Hydroxide  30 ml  02/27/21 22:22 





  Magnesium Hydroxide (Mom) Oral Liqd Udc  PO  





  Q4H PRN  





  Constipation  


 


Metoprolol Succinate  25 mg  02/28/21 10:00  02/28/21 09:16





  Metoprolol Succinate Xl 25 Mg Tab  PO   25 mg





  QDAY THU   Administration


 


Morphine Sulfate  2 mg  02/27/21 22:22 





  Morphine 2 Mg/1 Ml Inj  IV  





  Q4H PRN  





  Pain, Moderate (4-6)  


 


Ondansetron HCl  4 mg  02/27/21 22:22 





  Ondansetron 4 Mg/2 Ml Inj  IV  





  Q8H PRN  





  Nausea And Vomiting  


 


Sodium Chloride  10 ml  02/28/21 10:00  03/01/21 00:30





  Sodium Chloride 0.9% 10 Ml Flush Syringe  IV   10 ml





  BID THU   Administration


 


Sodium Chloride  10 ml  02/27/21 22:22 





  Sodium Chloride 0.9% 10 Ml Flush Syringe  IV  





  PRN PRN  





  LINE FLUSH

## 2021-03-02 LAB
BUN SERPL-MCNC: 17 MG/DL (ref 7–17)
BUN/CREAT SERPL: 3 %
CALCIUM SERPL-MCNC: 9.1 MG/DL (ref 8.4–10.2)
HEMOLYSIS INDEX: 1

## 2021-03-02 RX ADMIN — CLOPIDOGREL BISULFATE SCH: 75 TABLET ORAL at 14:12

## 2021-03-02 RX ADMIN — GABAPENTIN SCH: 100 CAPSULE ORAL at 14:11

## 2021-03-02 RX ADMIN — GABAPENTIN SCH: 100 CAPSULE ORAL at 22:05

## 2021-03-02 RX ADMIN — METOPROLOL TARTRATE SCH: 50 TABLET, FILM COATED ORAL at 14:15

## 2021-03-02 RX ADMIN — DEXTROSE MONOHYDRATE PRN ML: 25 INJECTION, SOLUTION INTRAVENOUS at 10:47

## 2021-03-02 RX ADMIN — INSULIN LISPRO SCH: 100 INJECTION, SOLUTION INTRAVENOUS; SUBCUTANEOUS at 08:16

## 2021-03-02 RX ADMIN — METRONIDAZOLE SCH: 5 INJECTION, SOLUTION INTRAVENOUS at 14:15

## 2021-03-02 RX ADMIN — FAMOTIDINE SCH: 20 TABLET ORAL at 14:12

## 2021-03-02 RX ADMIN — INSULIN LISPRO SCH: 100 INJECTION, SOLUTION INTRAVENOUS; SUBCUTANEOUS at 18:04

## 2021-03-02 RX ADMIN — Medication PRN ML: at 06:41

## 2021-03-02 RX ADMIN — ASPIRIN SCH: 81 TABLET, CHEWABLE ORAL at 14:11

## 2021-03-02 RX ADMIN — Medication SCH: at 14:14

## 2021-03-02 RX ADMIN — INSULIN LISPRO SCH: 100 INJECTION, SOLUTION INTRAVENOUS; SUBCUTANEOUS at 22:05

## 2021-03-02 RX ADMIN — METRONIDAZOLE SCH MLS/HR: 5 INJECTION, SOLUTION INTRAVENOUS at 06:40

## 2021-03-02 RX ADMIN — Medication SCH ML: at 22:09

## 2021-03-02 RX ADMIN — GABAPENTIN SCH MG: 100 CAPSULE ORAL at 23:27

## 2021-03-02 RX ADMIN — CINACALCET HYDROCHLORIDE SCH: 30 TABLET, FILM COATED ORAL at 14:12

## 2021-03-02 RX ADMIN — CEFTRIAXONE SODIUM SCH MLS/HR: 1 INJECTION, POWDER, FOR SOLUTION INTRAMUSCULAR; INTRAVENOUS at 17:03

## 2021-03-02 RX ADMIN — METOPROLOL TARTRATE SCH MG: 50 TABLET, FILM COATED ORAL at 23:28

## 2021-03-02 RX ADMIN — METOPROLOL TARTRATE SCH: 50 TABLET, FILM COATED ORAL at 14:14

## 2021-03-02 RX ADMIN — METOPROLOL TARTRATE SCH: 50 TABLET, FILM COATED ORAL at 22:05

## 2021-03-02 RX ADMIN — INSULIN LISPRO SCH: 100 INJECTION, SOLUTION INTRAVENOUS; SUBCUTANEOUS at 14:14

## 2021-03-02 NOTE — PROGRESS NOTE
Assessment and Plan





Pt presented with AMS, Hyperkalemia, missed dialysis. She was noted to be in SVT

and was converted to Sinus Rhythm with IV Adenosine. She is known to have PSVT. 

Continue volume and electrolyte management per nephrology. 


Tele reviewed overnight, no additional SVT noted. Increase BB as tolerated.


CE elevation appears consistent with NSTEMI II. Nothing further to add from 

cardiology perspective. 


Will sign off.


Recommend f/u with Dr Chavez, Cardiology within 1-2 weeks of discharge. [phone]





This patient was seen in conjunction with Dr Chavez, who agrees with this 

assessment and plan of care. 

















- Patient Problems


(1) PSVT (paroxysmal supraventricular tachycardia)


Current Visit: Yes   Status: Acute   





(2) AMS (altered mental status)


Current Visit: Yes   Status: Acute   





(3) ESRD (end stage renal disease)


Current Visit: Yes   Status: Chronic   





(4) Missed dialysis


Current Visit: Yes   Status: Acute   





(5) Hyperkalemia


Current Visit: Yes   Status: Acute   





(6) NSTEMI (non-ST elevated myocardial infarction)


Current Visit: Yes   Status: Acute   





(7) DM2 (diabetes mellitus, type 2)


Current Visit: Yes   Status: Chronic   





(8) HTN (hypertension)


Current Visit: Yes   Status: Chronic   


Qualifiers: 


   Hypertension type: essential hypertension   Qualified Code(s): I10 - 

Essential (primary) hypertension   





(9) History of CVA (cerebrovascular accident)


Current Visit: Yes   Status: Chronic   





Subjective


Date of service: 03/02/21


Principal diagnosis: ESRD on HD


Interval history: 





Patient is resting comfortably in bed. Patient is alert, asymptomatic. Nurse 

reports patient is refusing medications. Discussed importance of medication 

compliance. 


Tele reviewed: Sinus Tach , Triplet PVC 2-3 beat run, nonsustained noted 

overnight..











Objective


                                        





                                Last Vital Signs











Temp  97.5 F L  03/02/21 07:48


 


Pulse  106 H  03/02/21 07:48


 


Resp  20   03/02/21 07:48


 


BP  143/80   03/02/21 07:48


 


Pulse Ox  99   03/02/21 07:48

















- Physical Examination


General: No Apparent Distress


Neck: Positive: neck supple


Cardiac: Positive: Reg Rate and Rhythm, S1/S2


Lungs: Positive: clear to auscultation, Normal Breath Sounds


Neuro: Positive: Grossly Intact


Abdomen: Positive: Unremarkable


Skin: Negative: Rash, Wound


Extremities: Present: upper extr. pulses, lower extr. pulses.  Absent: edema





- Labs and Meds


                          Comprehensive Metabolic Panel











  03/02/21 Range/Units





  04:41 


 


Sodium  138  (137-145)  mmol/L


 


Potassium  3.5 L  (3.6-5.0)  mmol/L


 


Chloride  98.0  ()  mmol/L


 


Carbon Dioxide  28  (22-30)  mmol/L


 


BUN  17  (7-17)  mg/dL


 


Creatinine  5.0 H  (0.6-1.2)  mg/dL


 


Glucose  95  ()  mg/dL


 


Calcium  9.1  (8.4-10.2)  mg/dL














- Imaging and Cardiology


EKG: report reviewed, image reviewed


Echo: report reviewed (Echo Reviewed: EF 60-65%. Severe left ventricular 

hypertrophy. Vent Diastolic filling pattern is restrictive, elevated mean left 

atrial pressure. LA severely dilated. RV mildly dilated. RV global systolic 

function mildly reduced. RA mildly dilated.  Trace AR, Mild MR, Trace TR.)





- EKG


Sinus rhythms and dysrhythmias: sinus tachycardia ()


Supraventricular dysrhythmia: ectopic atrial rhythm

## 2021-03-02 NOTE — PROGRESS NOTE
Assessment and Plan





Cultures:


2/27/2021 blood culture: No growth


2/28/2021 COVID-19 PCR: Negative


Hepatitis panel: Non-reactive





Assessment: 50 years old female with history of ESRD on hemodialysis, diabetes 

mellitus, CVA, SVT, admitted on 2/27/2021 secondary to altered mental status for

24 hours and right eye edema:





#Sepsis: Present on admission with fever, tachycardia, elevated lactate; unclear

etiology, possible bilateral pneumonia versus preseptal cellulitis.  UA 

negative.





#Bilateral pneumonia v/s  volume overload: Patient had missed hemodialysis 

sessions x2.  COVID-19 PCR negative





#Right sided mild proptosis: No erythema seen, conjunctival minimally injected, 

unclear if this is trauma versus preseptal cellulitis.  Facial CT shows right 

preseptal periorbital soft tissue swelling. Seems to be better





#Acute hypoxemic respiratory failure: Currently on 2 L.  Likely secondary to 

pneumonia versus volume overload.





#ESRD: Missed hemodialysis x 2.





#Elevated D-dimer: VQ negative for PE.





#Acute encephalopathy: Likely metabolic/uremic.  CT of head unremarkable.  





#Right index tenderness: ?Previous surgery.  X-ray with foreign body.





Recommendations:


-de-escalated Cefepime, Flagyl to Ceftriaxone


-continue with IV vancomycin 


-Given slowly improving mental status with dialysis, can hold off on LP





Melchor Blake MD, FACP


[facility] ([facility]


O: [phone]


F: [phone]





Subjective


Date of service: 03/02/21


Principal diagnosis: ESRD on HD


Interval history: 


No fever. Drowsy, confused, but can be awakened.





Objective





- Exam


Narrative Exam: 





Physical Exam: 


Constitutional: drowsy


Head, Ears, Nose: Normocephalic, atraumatic. External ears, nose normal


Eyes: Conjunctivae/corneas clear. No icterus. No ptosis.


Neck: Supple, no meningeal signs


Cardiovascular: S1, S2 normal. 


Respiratory: Good air entry, clear to auscultation bilaterally


GI: Soft, non-tender; bowel sounds normal. No peritoneal signs


Musculoskeletal: No pedal edema, no cyanosis.


Skin: No rash or abscess


Hem/Lymphatic: No palpable cervical or supraclavicular nodes. No lymphangitis


Psych: No agitation


Neurological: drowsy, can be awakened, confused. 





- Constitutional


Vitals: 


                                   Vital Signs











Temp Pulse Resp BP Pulse Ox


 


 98.1 F   107 H  18   118/72   96 


 


 03/02/21 11:22  03/02/21 11:22  03/02/21 11:22  03/02/21 11:22  03/02/21 11:22








                           Temperature -Last 24 Hours











Temperature                    98.1 F


 


Temperature                    97.5 F


 


Temperature                    99.8 F


 


Temperature                    97.6 F


 


Temperature                    98.5 F


 


Temperature                    98.9 F

















- Labs


CBC & Chem 7: 


                                 02/28/21 04:36





                                 03/02/21 04:41


Labs: 


                              Abnormal lab results











  03/01/21 03/02/21 03/02/21 Range/Units





  15:46 04:41 10:42 


 


Potassium   3.5 L   (3.6-5.0)  mmol/L


 


Creatinine   5.0 H   (0.6-1.2)  mg/dL


 


POC Glucose  107 H   36 L  ()  mg/dL

## 2021-03-02 NOTE — PROGRESS NOTE
Assessment and Plan





ssessment and Plan


Assessment and plan: 


--Dysphagia/patient refusing to eat 


Current Visit: Yes   Status: Acute   


Plan to address problem: 


Follow speech therapy evaluation


Dobbhoff placement for medications and tube feeding


If needed, continue supportive care





--Acute toxic metabolic encephalopathy


Current Visit: Yes   Status: Acute   


Plan to address problem: 


Multifactorial, uremia, severe hyperkalemia.  


SVT and possible COVID-19


Treat the underlying cause, supportive care


Neurochecks


CT ;head negative for acute abnormality


CT face; periorbital swelling





--Right eye lid and facial swelling:


Current Visit: Yes   Status: Acute . 


Plan to address problem: 


 Slightly improved, continue current management





--Severe sepsis:


Current Visit: Yes   Status: Acute . 


Plan to address problem: 


Fever tachycardia lactic acidosis, altered level of consciousness


ID following, empiric antibiotics cefepime Vanco and metronidazole


Follow cultures





--Hyperkalemia


Current Visit: Yes   Status: Acute .  


Plan to address problem: 


Patient  received insulin and glucose, calcium gluconate and sodium bicarb.


This morning's potassium is 5.6, calcium gluconate 1 dose


Hemodialysis, nephrology consulted





--ESRD on hemodialysis TTS


Current Visit: Yes   Status: Chronic   


Plan to address problem: 


Hemodialysis per schedule


Nephrology consulted





--Noncompliance with HD


Current Visit: Yes   Status: Acute   


Plan to address problem: 


Patient strongly advised to comply with medications diet 


follow-up visit especially hemodialysis per schedule





--SVT (supraventricular tachycardia)


Current Visit: Yes   Status: Acute   


Plan to address problem: 


Patient received adenosine, 


Resumed home medication metoprolol


Cardiology consulted





--Chronic elevation of troponin/NSTEMI 2


Current Visit: Yes   Status: Acute   


Plan to address problem: 


Probably due to ESRD


However patient has multiple risk factors.  


Cardiology consulted





--PUI /negative  COVID-19 test


Current Visit: Yes   Status: Acute   


Plan to address problem: 


DC isolation, corona PCR test negative





--DM2 (diabetes mellitus, type 2)


Current Visit: No   Status: Chronic   


Plan to address problem: 


Accu-Chek sliding scale coverage ADA diet


Insulin as needed





--Hypertensive urgency; POA


Current Visit: No   Status: Chronic.  


Now moderate control, continue current antihypertensives


As needed hydralazine   





--DVT prophylaxis


Current Visit: No   Status: Acute   


Plan to address problem: 


Continue subcu heparin renal dose





--Full code status


Current Visit: Yes   Status: Acute   


Plan to address problem: 





Subjective


Date of service: 03/02/21


Principal diagnosis: ESRD on HD


Interval history: 








Brief history


50-year-old -American female with known history of end-stage renal 

disease on dialysis, diabetes mellitus, CVA and history of SVT presenting to the

emergency room today via EMS with altered mental status.  Upon arrival in the 

emergency room patient was found to be in SVT and also had a fever.  Temperature

was said to be about 102 F.  Patient received 6 mg of adenosine and 

subsequently 12 mg of adenosine in route to the hospital.  Subsequent rhythm was

said to have been a sinus tachycardia.  She had an initial heart rate of about 

200 which later improved to about 140.


Patient was evaluated by infectious diseases, nephrology, received hemodialysis 

per schedule, patient symptoms slightly improved





3/1; patient is more alert and awake facial swelling slightly improved, on 

empiric antibiotics per ID


Refusing to eat, speech therapy consulted, follow speech and swallow, Dobbhoff 

placement for medications and feeds if no improvement





3/2/2021


Patient lethargic and unable to eat





History


Interval history: 


I have seen and examined the patient at the bedside


Patient's chart and medications reviewed


Patient received hemodialysis today


More alert and awake, minimally communicative


Refusing to take oral


Pending speech therapy evaluation




















Objective





- Constitutional


Vitals: 


                               Vital Signs - 12hr











  03/02/21 03/02/21





  07:48 11:22


 


Temperature 97.5 F L 98.1 F


 


Pulse Rate 106 H 107 H


 


Respiratory 20 18





Rate  


 


Blood Pressure 143/80 118/72


 


O2 Sat by Pulse 99 96





Oximetry  











General appearance: Present: no acute distress, well-nourished





- EENT


Eyes: PERRL, EOM intact


ENT: hearing intact, clear oral mucosa


Ears: bilateral: normal





- Neck


Neck: supple, normal ROM





- Respiratory


Respiratory effort: normal


Respiratory: bilateral: CTA





- Breasts


Breasts: normal





- Cardiovascular


Rhythm: regular


Heart Sounds: Present: S1 & S2.  Absent: gallop, rub


Extremities: pulses intact, No edema, normal color, Full ROM





- Gastrointestinal


General gastrointestinal: Present: soft, non-tender, non-distended, normal bowel

sounds





- Genitourinary


Female genitourinary: normal





- Integumentary


Integumentary: clear, warm, dry





- Musculoskeletal


Musculoskeletal: 1, strength equal bilaterally





- Neurologic


Neurologic: moves all extremities





- Psychiatric


Psychiatric: memory intact, appropriate mood/affect, intact judgment & insight





- Labs


CBC & Chem 7: 


                                 03/08/21 06:58





                                 03/08/21 06:58


Labs: 


                              Abnormal lab results











  03/02/21 03/02/21 03/02/21 Range/Units





  04:41 10:42 13:11 


 


Potassium  3.5 L    (3.6-5.0)  mmol/L


 


Creatinine  5.0 H    (0.6-1.2)  mg/dL


 


POC Glucose   36 L   ()  mg/dL


 


Troponin T    0.395 H* D  (0.00-0.029)  ng/mL














HEART Score





- HEART Score


Troponin: 


                                        











Troponin T  0.395 ng/mL (0.00-0.029)  H* D 03/02/21  13:11

## 2021-03-02 NOTE — PROGRESS NOTE
Assessment and Plan


(Principal problem)-Altered mental Status


Hyperkalemia


SVT


End Stage Renal Disease


Hypertension


Anemia


COVID-19 R/O








Plan: 


-no indication for HD today


-Fluid restriction of 1 liter per day


-Low potassium diet


-Monitor I/O's


-Obtain daily weights


-Assess dialysis needs daily





Mitch Baker MD


[phone]








Subjective


Date of service: 03/02/21


Principal diagnosis: ESRD on HD


Interval history: 


tolerated HD yesterday








Objective





- Vital Signs


Vital signs: 


                               Vital Signs - 12hr











  03/01/21 03/02/21 03/02/21





  22:12 00:38 04:04


 


Temperature  97.6 F 99.8 F H


 


Pulse Rate 121 H 115 H 100 H


 


Respiratory  20 20





Rate   


 


Blood Pressure 109/77 139/82 116/74


 


O2 Sat by Pulse  100 99





Oximetry   














- General Appearance


General appearance: well-developed


EENT: ATNC, PERRL, mucous membranes dry


Neck: no JVD


Respiratory: Present: Clear to Ascultation


Cardiology: regular, S1S2


Gastrointestinal: normoactive bowel sounds, no tenderness, no distended, no 

masses, no guarding


Integumentary: no rash, warm and dry


Neurologic: no focal deficit


Musculoskeletal: other (no edema in BLER)


Psychiatric: other (answers simple questions)





- Lab





                                 02/28/21 04:36





                                 03/02/21 04:41


                             Most recent lab results











Calcium  9.1 mg/dL (8.4-10.2)   03/02/21  04:41    


 


Phosphorus  6.80 mg/dL (2.5-4.5)  H  03/01/21  04:28    














Medications & Allergies





- Medications


Allergies/Adverse Reactions: 


                                    Allergies





diphenhydramine [From Benadryl] Allergy (Verified 08/05/19 02:48)


   Unknown


lisinopril Allergy (Verified 08/05/19 02:48)


   Unknown


seafood Allergy (Uncoded 04/26/20 11:58)


   Angioedema








Home Medications: 


                                Home Medications











 Medication  Instructions  Recorded  Confirmed  Last Taken  Type


 


Gabapentin 100 mg PO BID 03/14/20 10/17/20 03/08/20 History


 


traMADoL [Ultram 50 MG tab] 50 mg PO PRN 03/14/20 10/17/20 Unknown History


 


Cinacalcet HCl [Sensipar] 30 mg PO QDAY #30 04/26/20 10/17/20 Unknown Rx


 


Epoetin Jayy 10,000 Unit [Procrit] 10,000 unit IV SAVANNAH PRN #1 vial 04/26/20 

10/17/20 Unknown Rx


 


Famotidine [Pepcid] 20 mg PO QDAY #30 04/26/20 10/17/20 Unknown Rx


 


cloNIDine 0.1 mg PO QDAY #30 04/26/20 10/17/20 03/08/20 Rx


 


methylPREDNISolone [Medrol 4MG 1 tab PO QDAY #1 tab.ds.pk 04/26/20 10/17/20 

Unknown Rx





DOSEPAK (21 tabs)]     


 


oxyCODONE /ACETAMINOPHEN [Percocet 1 tab PO Q4H PRN #15 04/26/20 10/17/20 

Unknown Rx





5/325 mg]     


 


Aspirin [Aspirin BABY CHEW TAB] 81 mg PO QDAY #30 tab.chew 10/19/20  Unknown Rx


 


AtorvaSTATin [Lipitor] 20 mg PO QHS #30 tablet 10/19/20  Unknown Rx


 


Clopidogrel [Plavix] 75 mg PO QDAY #30 tablet 10/19/20  Unknown Rx


 


ISOSORBIDE MONOnitrate [Imdur ER] 30 mg PO QDAY #30 tablet 10/19/20  Unknown Rx


 


Metoprolol Xl [Metoprolol 25 mg PO QDAY #30 tablet 10/19/20  Unknown Rx





SUCCINATE ER TAB]     


 


oxyCODONE /ACETAMINOPHEN [Percocet 1 tab PO TID PRN #10 tablet 10/19/20  Unknown

 Rx





5/325]     











Active Medications: 














Generic Name Dose Route Start Last Admin





  Trade Name Freq  PRN Reason Stop Dose Admin


 


Acetaminophen  650 mg  02/27/21 22:22 





  Acetaminophen 325 Mg Tab  PO  





  Q4H PRN  





  Pain MILD(1-3)/Fever >100.5/HA  


 


Aspirin  81 mg  02/28/21 10:00  03/01/21 14:22





  Aspirin 81 Mg Tab Chew  PO   Not Given





  QDAY THU  


 


Atorvastatin Calcium  20 mg  02/28/21 22:00  03/01/21 22:12





  Atorvastatin 20 Mg Tab  PO   20 mg





  QHS THU   Administration


 


Cinacalcet  30 mg  02/28/21 10:00  03/01/21 14:22





  Cinacalcet 30 Mg Tab  PO   Not Given





  QDAY THU  


 


Clonidine HCl  0.1 mg  02/28/21 10:00  03/01/21 10:00





  Clonidine 0.1 Mg Tab  PO   Not Given





  QDAY THU  


 


Clopidogrel Bisulfate  75 mg  02/28/21 10:00  03/01/21 14:22





  Clopidogrel 75 Mg Tab  PO   Not Given





  QDAY [facility]  


 


Dextrose  50 ml  02/27/21 22:22  02/28/21 23:40





  Dextrose 50% In Water (25gm) 50 Ml Syringe  IV   50 ml





  Q30MIN PRN   Administration





  Hypoglycemia  





  Protocol  


 


Famotidine  20 mg  02/28/21 10:00  03/01/21 14:22





  Famotidine 20 Mg Tab  PO   Not Given





  QDAY THU  


 


Gabapentin  100 mg  02/28/21 10:00  03/01/21 22:12





  Gabapentin 100 Mg Cap  PO   100 mg





  BID THU   Administration


 


Cefepime HCl  1 gm in 100 mls @ 200 mls/hr  02/28/21 20:00  03/01/21 22:12





  Cefepime/Ns 1 Gm/100 Ml  IV   200 mls/hr





  Q24H THU   Administration





  Protocol  


 


Metronidazole  500 mg in 100 mls @ 100 mls/hr  02/28/21 13:00  03/02/21 06:40





  Flagyl 500 Mg/100 Ml  IV   100 mls/hr





  Q8H [facility]   Administration





  Protocol  


 


Insulin Human Lispro  0 unit  02/28/21 07:30  03/02/21 08:16





  Insulin Lispro 100 Unit/Ml  SUB-Q   Not Given





  ACHS [facility]  





  Protocol  


 


Isosorbide Mononitrate  30 mg  02/28/21 10:00  03/01/21 10:00





  Isosorbide Mononitrate Er 30 Mg Tab  PO   Not Given





  QDAY [facility]  


 


Magnesium Hydroxide  30 ml  02/27/21 22:22 





  Magnesium Hydroxide (Mom) Oral Liqd Udc  PO  





  Q4H PRN  





  Constipation  


 


Metoprolol Tartrate  50 mg  03/01/21 22:00  03/01/21 22:12





  Metoprolol Tartrate 50 Mg Tab  PO   50 mg





  BID THU   Administration


 


Morphine Sulfate  2 mg  02/27/21 22:22 





  Morphine 2 Mg/1 Ml Inj  IV  





  Q4H PRN  





  Pain, Moderate (4-6)  


 


Ondansetron HCl  4 mg  02/27/21 22:22 





  Ondansetron 4 Mg/2 Ml Inj  IV  





  Q8H PRN  





  Nausea And Vomiting  


 


Sodium Chloride  10 ml  02/28/21 10:00  03/01/21 22:13





  Sodium Chloride 0.9% 10 Ml Flush Syringe  IV   10 ml





  BID THU   Administration


 


Sodium Chloride  10 ml  02/27/21 22:22  03/02/21 06:41





  Sodium Chloride 0.9% 10 Ml Flush Syringe  IV   10 ml





  PRN PRN   Administration





  LINE FLUSH

## 2021-03-03 PROCEDURE — 5A1D70Z PERFORMANCE OF URINARY FILTRATION, INTERMITTENT, LESS THAN 6 HOURS PER DAY: ICD-10-PCS | Performed by: INTERNAL MEDICINE

## 2021-03-03 RX ADMIN — INSULIN LISPRO SCH: 100 INJECTION, SOLUTION INTRAVENOUS; SUBCUTANEOUS at 08:30

## 2021-03-03 RX ADMIN — Medication SCH ML: at 09:18

## 2021-03-03 RX ADMIN — INSULIN LISPRO SCH: 100 INJECTION, SOLUTION INTRAVENOUS; SUBCUTANEOUS at 12:52

## 2021-03-03 RX ADMIN — METOPROLOL TARTRATE SCH MG: 50 TABLET, FILM COATED ORAL at 08:38

## 2021-03-03 RX ADMIN — FAMOTIDINE SCH MG: 20 TABLET ORAL at 09:18

## 2021-03-03 RX ADMIN — INSULIN LISPRO SCH: 100 INJECTION, SOLUTION INTRAVENOUS; SUBCUTANEOUS at 17:22

## 2021-03-03 RX ADMIN — GABAPENTIN SCH MG: 100 CAPSULE ORAL at 09:18

## 2021-03-03 RX ADMIN — INSULIN LISPRO SCH: 100 INJECTION, SOLUTION INTRAVENOUS; SUBCUTANEOUS at 23:22

## 2021-03-03 RX ADMIN — CLOPIDOGREL BISULFATE SCH MG: 75 TABLET ORAL at 09:17

## 2021-03-03 RX ADMIN — CEFTRIAXONE SODIUM SCH MLS/HR: 1 INJECTION, POWDER, FOR SOLUTION INTRAMUSCULAR; INTRAVENOUS at 18:52

## 2021-03-03 RX ADMIN — METOPROLOL TARTRATE SCH: 50 TABLET, FILM COATED ORAL at 14:00

## 2021-03-03 RX ADMIN — ASPIRIN SCH MG: 81 TABLET, CHEWABLE ORAL at 09:17

## 2021-03-03 RX ADMIN — CINACALCET HYDROCHLORIDE SCH MG: 30 TABLET, FILM COATED ORAL at 09:17

## 2021-03-03 NOTE — PROGRESS NOTE
Assessment and Plan





Cultures:


2/27/2021 blood culture: No growth


2/28/2021 COVID-19 PCR: Negative


Hepatitis panel: Non-reactive





Assessment: 50 years old female with history of ESRD on hemodialysis, diabetes 

mellitus, CVA, SVT, admitted on 2/27/2021 secondary to altered mental status for

24 hours and right eye edema:





#Sepsis: Present on admission with fever, tachycardia, elevated lactate; unclear

etiology. UA negative. No leucocytosis, only 1 fever.





#Bilateral pneumonia v/s volume overload: Patient had missed hemodialysis 

sessions x2.  COVID-19 PCR negative, CXR without obvious infiltrate





#Right sided mild proptosis: No erythema seen, conjunctival minimally injected, 

unclear if this was trauma versus preseptal cellulitis.  Facial CT showed right 

preseptal periorbital soft tissue swelling. Seems to have resolved





#Acute hypoxemic respiratory failure:  Likely secondary to pneumonia versus 

volume overload.





#ESRD: Missed hemodialysis x 2 prior to admission.





#Elevated D-dimer: VQ negative for PE.





#Acute encephalopathy: Likely metabolic/uremic.  CT of head unremarkable.  





#Right index tenderness: ?Previous surgery.  X-ray with foreign body.





Recommendations:


-mental status is improved, plan to stop abx after today's doses of Ceftriaxone 

and Vancomycin





Melchor Blake MD, FACP


[facility] ([facility]


O: [phone]


F: [phone]





Subjective


Date of service: 03/03/21


Principal diagnosis: ESRD on HD


Interval history: 


No fever. Much more awake today. Denies any complaints.





Objective





- Exam


Narrative Exam: 





Physical Exam: 


Constitutional: awake, alert


Head, Ears, Nose: Normocephalic, atraumatic. External ears, nose normal


Eyes: Conjunctivae/corneas clear. No icterus. No ptosis.


Neck: Supple, no meningeal signs


Cardiovascular: S1, S2 normal. 


Respiratory: Good air entry, clear to auscultation bilaterally


GI: Soft, non-tender; bowel sounds normal. No peritoneal signs


Musculoskeletal: No pedal edema, no cyanosis.


Skin: No rash or abscess


Hem/Lymphatic: No palpable cervical or supraclavicular nodes. No lymphangitis


Psych: No agitation


Neurological: awake, alert





- Constitutional


Vitals: 


                                   Vital Signs











Temp Pulse Resp BP Pulse Ox


 


 98 F   88   18   114/78   100 


 


 03/03/21 12:13  03/03/21 12:13  03/03/21 12:13  03/03/21 12:13  03/03/21 12:13








                           Temperature -Last 24 Hours











Temperature                    98 F


 


Temperature                    98.6 F


 


Temperature                    99.2 F


 


Temperature                    98.9 F


 


Temperature                    98.5 F

















- Labs


CBC & Chem 7: 


                                 02/28/21 04:36





                                 03/02/21 13:11


Labs: 


                              Abnormal lab results











  03/02/21 Range/Units





  13:11 


 


Troponin T  0.395 H* D  (0.00-0.029)  ng/mL

## 2021-03-03 NOTE — PROGRESS NOTE
Assessment and Plan








Assessment:


(Principal problem)-Altered mental Status


(Principal problem)-Hyperkalemia


(Principal problem)-SVT


End Stage Renal Disease


Hypertension


Anemia


COVID-19 R/O








Plan: 


-Hemodialysis today for UF and clearance 


-Fluid restriction of 1 liter per day


-Low potassium diet


-Monitor I/O's


-Obtain daily weights


-Assess dialysis needs daily





Subjective


Date of service: 03/03/21


Principal diagnosis: ESRD on HD


Interval history: 





Patient seen sleeping in bed. No family at bedside.





Objective





- Vital Signs


Vital signs: 


                               Vital Signs - 12hr











  03/02/21 03/02/21 03/03/21





  23:17 23:34 04:10


 


Temperature 98.9 F  99.2 F


 


Pulse Rate 106 H  90


 


Respiratory 20 16 20





Rate   


 


Blood Pressure 130/80  114/73


 


O2 Sat by Pulse 100  100





Oximetry   














  03/03/21 03/03/21 03/03/21





  07:56 08:38 09:24


 


Temperature 98.6 F  


 


Pulse Rate 89 89 89


 


Respiratory 18  





Rate   


 


Blood Pressure 122/75 122/75 122/75


 


O2 Sat by Pulse 98  





Oximetry   














- General Appearance


General appearance: other (No acte distress, sleeping)


EENT: ATNC


Neck: no JVD, supple


Respiratory: Present: Decreased Breath Sounds


Cardiology: S1S2


Gastrointestinal: normoactive bowel sounds


Integumentary: warm and dry


Neurologic: other (Sleeping heavily)


Musculoskeletal: other (no edema)





- Lab





                                 02/28/21 04:36





                                 03/02/21 13:11


                             Most recent lab results











Calcium  9.1 mg/dL (8.4-10.2)   03/02/21  04:41    


 


Phosphorus  6.80 mg/dL (2.5-4.5)  H  03/01/21  04:28    














Medications & Allergies





- Medications


Allergies/Adverse Reactions: 


                                    Allergies





diphenhydramine [From Benadryl] Allergy (Verified 08/05/19 02:48)


   Unknown


lisinopril Allergy (Verified 08/05/19 02:48)


   Unknown


seafood Allergy (Uncoded 04/26/20 11:58)


   Angioedema








Home Medications: 


                                Home Medications











 Medication  Instructions  Recorded  Confirmed  Last Taken  Type


 


Gabapentin 100 mg PO BID 03/14/20 03/02/21 03/08/20 History


 


traMADoL [Ultram 50 MG tab] 50 mg PO PRN 03/14/20 03/02/21 Unknown History


 


Cinacalcet HCl [Sensipar] 30 mg PO QDAY #30 04/26/20 03/02/21 Unknown Rx


 


Epoetin Jayy 10,000 Unit [Procrit] 10,000 unit IV SAVANNAH PRN #1 vial 04/26/20 03/02/21 Unknown Rx


 


Famotidine [Pepcid] 20 mg PO QDAY #30 04/26/20 03/02/21 Unknown Rx


 


cloNIDine 0.1 mg PO QDAY #30 04/26/20 03/02/21 03/08/20 Rx


 


Aspirin [Aspirin BABY CHEW TAB] 81 mg PO QDAY #30 tab.chew 10/19/20 03/02/21 

Unknown Rx


 


AtorvaSTATin [Lipitor] 20 mg PO QHS #30 tablet 10/19/20 03/02/21 Unknown Rx


 


Clopidogrel [Plavix] 75 mg PO QDAY #30 tablet 10/19/20 03/02/21 Unknown Rx


 


ISOSORBIDE MONOnitrate [Imdur ER] 30 mg PO QDAY #30 tablet 10/19/20 03/02/21 

Unknown Rx


 


Metoprolol Xl [Metoprolol 25 mg PO QDAY #30 tablet 10/19/20 03/02/21 Unknown Rx





SUCCINATE ER TAB]     











Active Medications: 














Generic Name Dose Route Start Last Admin





  Trade Name Freq  PRN Reason Stop Dose Admin


 


Acetaminophen  650 mg  02/27/21 22:22 





  Acetaminophen 325 Mg Tab  PO  





  Q4H PRN  





  Pain MILD(1-3)/Fever >100.5/HA  


 


Aspirin  81 mg  02/28/21 10:00  03/03/21 09:17





  Aspirin 81 Mg Tab Chew  PO   81 mg





  QDAY THU   Administration


 


Atorvastatin Calcium  20 mg  02/28/21 22:00  03/02/21 23:28





  Atorvastatin 20 Mg Tab  PO   20 mg





  QHS THU   Administration


 


Cinacalcet  30 mg  02/28/21 10:00  03/03/21 09:17





  Cinacalcet 30 Mg Tab  PO   30 mg





  QDAY THU   Administration


 


Clonidine HCl  0.1 mg  02/28/21 10:00  03/02/21 14:11





  Clonidine 0.1 Mg Tab  PO   Not Given





  QDAY THU  


 


Clopidogrel Bisulfate  75 mg  02/28/21 10:00  03/03/21 09:17





  Clopidogrel 75 Mg Tab  PO   75 mg





  QDAY THU   Administration


 


Dextrose  50 ml  02/27/21 22:22  03/02/21 10:47





  Dextrose 50% In Water (25gm) 50 Ml Syringe  IV   50 ml





  Q30MIN PRN   Administration





  Hypoglycemia  





  Protocol  


 


Famotidine  20 mg  02/28/21 10:00  03/03/21 09:18





  Famotidine 20 Mg Tab  PO   20 mg





  QDAY THU   Administration


 


Gabapentin  100 mg  02/28/21 10:00  03/03/21 09:18





  Gabapentin 100 Mg Cap  PO   100 mg





  BID THU   Administration


 


Ceftriaxone Sodium  1 gm in 50 mls @ 100 mls/hr  03/02/21 15:00  03/02/21 17:03





  Rocephin/Ns 1 Gm/50 Ml  IV   100 mls/hr





  Q24H THU   Administration





  Protocol  


 


Insulin Human Lispro  0 unit  02/28/21 07:30  03/03/21 08:30





  Insulin Lispro 100 Unit/Ml  SUB-Q   Not Given





  [facility]  





  Protocol  


 


Isosorbide Mononitrate  30 mg  02/28/21 10:00  03/03/21 09:24





  Isosorbide Mononitrate Er 30 Mg Tab  PO   Not Given





  QDAY THU  


 


Magnesium Hydroxide  30 ml  02/27/21 22:22 





  Magnesium Hydroxide (Mom) Oral Liqd Udc  PO  





  Q4H PRN  





  Constipation  


 


Metoprolol Tartrate  50 mg  03/02/21 14:00  03/03/21 08:38





  Metoprolol Tartrate 50 Mg Tab  PO   50 mg





  TID THU   Administration


 


Morphine Sulfate  2 mg  02/27/21 22:22 





  Morphine 2 Mg/1 Ml Inj  IV  





  Q4H PRN  





  Pain, Moderate (4-6)  


 


Ondansetron HCl  4 mg  02/27/21 22:22 





  Ondansetron 4 Mg/2 Ml Inj  IV  





  Q8H PRN  





  Nausea And Vomiting  


 


Sodium Chloride  10 ml  02/28/21 10:00  03/03/21 09:18





  Sodium Chloride 0.9% 10 Ml Flush Syringe  IV   10 ml





  BID THU   Administration


 


Sodium Chloride  10 ml  02/27/21 22:22  03/02/21 06:41





  Sodium Chloride 0.9% 10 Ml Flush Syringe  IV   10 ml





  PRN PRN   Administration





  LINE FLUSH

## 2021-03-04 LAB
BUN SERPL-MCNC: 12 MG/DL (ref 7–17)
BUN/CREAT SERPL: 3 %
CALCIUM SERPL-MCNC: 7.8 MG/DL (ref 8.4–10.2)
HEMOLYSIS INDEX: 59

## 2021-03-04 RX ADMIN — METOPROLOL TARTRATE SCH MG: 50 TABLET, FILM COATED ORAL at 10:31

## 2021-03-04 RX ADMIN — FAMOTIDINE SCH MG: 20 TABLET ORAL at 10:31

## 2021-03-04 RX ADMIN — DEXTROSE MONOHYDRATE PRN ML: 25 INJECTION, SOLUTION INTRAVENOUS at 08:23

## 2021-03-04 RX ADMIN — METOPROLOL TARTRATE SCH: 50 TABLET, FILM COATED ORAL at 14:26

## 2021-03-04 RX ADMIN — INSULIN LISPRO SCH: 100 INJECTION, SOLUTION INTRAVENOUS; SUBCUTANEOUS at 07:40

## 2021-03-04 RX ADMIN — CLOPIDOGREL BISULFATE SCH MG: 75 TABLET ORAL at 10:31

## 2021-03-04 RX ADMIN — INSULIN LISPRO SCH: 100 INJECTION, SOLUTION INTRAVENOUS; SUBCUTANEOUS at 17:00

## 2021-03-04 RX ADMIN — CINACALCET HYDROCHLORIDE SCH MG: 30 TABLET, FILM COATED ORAL at 10:31

## 2021-03-04 RX ADMIN — Medication SCH ML: at 21:07

## 2021-03-04 RX ADMIN — INSULIN LISPRO SCH: 100 INJECTION, SOLUTION INTRAVENOUS; SUBCUTANEOUS at 21:06

## 2021-03-04 RX ADMIN — METOPROLOL TARTRATE SCH MG: 50 TABLET, FILM COATED ORAL at 00:10

## 2021-03-04 RX ADMIN — ASPIRIN SCH MG: 81 TABLET, CHEWABLE ORAL at 10:31

## 2021-03-04 RX ADMIN — METOPROLOL TARTRATE SCH: 50 TABLET, FILM COATED ORAL at 21:07

## 2021-03-04 RX ADMIN — INSULIN LISPRO SCH: 100 INJECTION, SOLUTION INTRAVENOUS; SUBCUTANEOUS at 12:07

## 2021-03-04 RX ADMIN — GABAPENTIN SCH MG: 100 CAPSULE ORAL at 21:06

## 2021-03-04 RX ADMIN — GABAPENTIN SCH MG: 100 CAPSULE ORAL at 00:10

## 2021-03-04 RX ADMIN — Medication SCH ML: at 10:32

## 2021-03-04 RX ADMIN — GABAPENTIN SCH MG: 100 CAPSULE ORAL at 10:31

## 2021-03-04 RX ADMIN — Medication SCH ML: at 00:10

## 2021-03-04 NOTE — PROGRESS NOTE
Assessment and Plan


(Principal problem)-Altered mental Status


Hyperkalemia


SVT


End Stage Renal Disease


Hypertension


Anemia


COVID-19 R/O








Plan: 


-labs are pending today


-no indication for HD today


-Fluid restriction of 1 liter per day


-Low potassium diet


-Monitor I/O's


-Obtain daily weights


-Assess dialysis needs daily











Subjective


Date of service: 03/04/21


Principal diagnosis: ESRD on HD


Interval history: 


had low blood sugar this AM








Objective





- Vital Signs


Vital signs: 


                               Vital Signs - 12hr











  03/03/21 03/04/21 03/04/21





  23:46 04:13 07:27


 


Temperature 97.4 F L 97.8 F 97.8 F


 


Pulse Rate 86 78 77


 


Respiratory 18 18 18





Rate   


 


Blood Pressure 107/68 123/80 113/71


 


O2 Sat by Pulse 99 94 100





Oximetry   














- Lab





                                 02/28/21 04:36





                                 03/02/21 13:11


                             Most recent lab results











Calcium  9.1 mg/dL (8.4-10.2)   03/02/21  04:41    


 


Phosphorus  6.80 mg/dL (2.5-4.5)  H  03/01/21  04:28    














Medications & Allergies





- Medications


Allergies/Adverse Reactions: 


                                    Allergies





diphenhydramine [From Benadryl] Allergy (Verified 08/05/19 02:48)


   Unknown


lisinopril Allergy (Verified 08/05/19 02:48)


   Unknown


seafood Allergy (Uncoded 04/26/20 11:58)


   Angioedema








Home Medications: 


                                Home Medications











 Medication  Instructions  Recorded  Confirmed  Last Taken  Type


 


Gabapentin 100 mg PO BID 03/14/20 03/02/21 03/08/20 History


 


traMADoL [Ultram 50 MG tab] 50 mg PO PRN 03/14/20 03/02/21 Unknown History


 


Cinacalcet HCl [Sensipar] 30 mg PO QDAY #30 04/26/20 03/02/21 Unknown Rx


 


Epoetin Jayy 10,000 Unit [Procrit] 10,000 unit IV SAVANNAH PRN #1 vial 04/26/20 03/02/21 Unknown Rx


 


Famotidine [Pepcid] 20 mg PO QDAY #30 04/26/20 03/02/21 Unknown Rx


 


cloNIDine 0.1 mg PO QDAY #30 04/26/20 03/02/21 03/08/20 Rx


 


Aspirin [Aspirin BABY CHEW TAB] 81 mg PO QDAY #30 tab.chew 10/19/20 03/02/21 

Unknown Rx


 


AtorvaSTATin [Lipitor] 20 mg PO QHS #30 tablet 10/19/20 03/02/21 Unknown Rx


 


Clopidogrel [Plavix] 75 mg PO QDAY #30 tablet 10/19/20 03/02/21 Unknown Rx


 


ISOSORBIDE MONOnitrate [Imdur ER] 30 mg PO QDAY #30 tablet 10/19/20 03/02/21 

Unknown Rx


 


Metoprolol Xl [Metoprolol 25 mg PO QDAY #30 tablet 10/19/20 03/02/21 Unknown Rx





SUCCINATE ER TAB]     











Active Medications: 














Generic Name Dose Route Start Last Admin





  Trade Name Freq  PRN Reason Stop Dose Admin


 


Acetaminophen  650 mg  02/27/21 22:22 





  Acetaminophen 325 Mg Tab  PO  





  Q4H PRN  





  Pain MILD(1-3)/Fever >100.5/HA  


 


Aspirin  81 mg  02/28/21 10:00  03/03/21 09:17





  Aspirin 81 Mg Tab Chew  PO   81 mg





  QDAY THU   Administration


 


Atorvastatin Calcium  20 mg  02/28/21 22:00  03/04/21 00:10





  Atorvastatin 20 Mg Tab  PO   20 mg





  [facility] THU   Administration


 


Cinacalcet  30 mg  02/28/21 10:00  03/03/21 09:17





  Cinacalcet 30 Mg Tab  PO   30 mg





  QDAY THU   Administration


 


Clonidine HCl  0.1 mg  02/28/21 10:00  03/03/21 12:52





  Clonidine 0.1 Mg Tab  PO   Not Given





  QDAY THU  


 


Clopidogrel Bisulfate  75 mg  02/28/21 10:00  03/03/21 09:17





  Clopidogrel 75 Mg Tab  PO   75 mg





  QDAY THU   Administration


 


Dextrose  50 ml  02/27/21 22:22  03/04/21 08:23





  Dextrose 50% In Water (25gm) 50 Ml Syringe  IV   50 ml





  Q30MIN PRN   Administration





  Hypoglycemia  





  Protocol  


 


Famotidine  20 mg  02/28/21 10:00  03/03/21 09:18





  Famotidine 20 Mg Tab  PO   20 mg





  QDAY THU   Administration


 


Gabapentin  100 mg  02/28/21 10:00  03/04/21 00:10





  Gabapentin 100 Mg Cap  PO   100 mg





  BID THU   Administration


 


Insulin Human Lispro  0 unit  02/28/21 07:30  03/04/21 07:40





  Insulin Lispro 100 Unit/Ml  SUB-Q   Not Given





  [facility] THU  





  Protocol  


 


Isosorbide Mononitrate  30 mg  02/28/21 10:00  03/03/21 09:24





  Isosorbide Mononitrate Er 30 Mg Tab  PO   Not Given





  QDAY [facility]  


 


Magnesium Hydroxide  30 ml  02/27/21 22:22 





  Magnesium Hydroxide (Mom) Oral Liqd Udc  PO  





  Q4H PRN  





  Constipation  


 


Metoprolol Tartrate  50 mg  03/02/21 14:00  03/04/21 00:10





  Metoprolol Tartrate 50 Mg Tab  PO   50 mg





  TID [facility]   Administration


 


Morphine Sulfate  2 mg  02/27/21 22:22 





  Morphine 2 Mg/1 Ml Inj  IV  





  Q4H PRN  





  Pain, Moderate (4-6)  


 


Ondansetron HCl  4 mg  02/27/21 22:22 





  Ondansetron 4 Mg/2 Ml Inj  IV  





  Q8H PRN  





  Nausea And Vomiting  


 


Sodium Chloride  10 ml  02/28/21 10:00  03/04/21 00:10





  Sodium Chloride 0.9% 10 Ml Flush Syringe  IV   10 ml





  BID THU   Administration


 


Sodium Chloride  10 ml  02/27/21 22:22  03/02/21 06:41





  Sodium Chloride 0.9% 10 Ml Flush Syringe  IV   10 ml





  PRN PRN   Administration





  LINE FLUSH

## 2021-03-04 NOTE — PROGRESS NOTE
Assessment and Plan





Cultures:


2/27/2021 blood culture: No growth


2/28/2021 COVID-19 PCR: Negative


Hepatitis panel: Non-reactive





Assessment: 50 years old female with history of ESRD on hemodialysis, diabetes 

mellitus, CVA, SVT, admitted on 2/27/2021 secondary to altered mental status for

24 hours and right eye edema:





#Sepsis: Present on admission with fever, tachycardia, elevated lactate; unclear

etiology. UA negative. No leucocytosis, only 1 fever.





#Bilateral pneumonia v/s volume overload: Patient had missed hemodialysis 

sessions x2.  COVID-19 PCR negative, CXR without obvious infiltrate





#Right sided mild proptosis: No erythema seen, conjunctival minimally injected, 

unclear if this was trauma versus preseptal cellulitis.  Facial CT showed right 

preseptal periorbital soft tissue swelling. Seems to have resolved





#Acute hypoxemic respiratory failure:  Likely secondary to pneumonia versus 

volume overload.





#ESRD: Missed hemodialysis x 2 prior to admission.





#Elevated D-dimer: VQ negative for PE.





#Acute encephalopathy: Likely metabolic/uremic.  CT of head unremarkable.  

Improved. 





#Right index tenderness: ?Previous surgery.  X-ray with foreign body.





Recommendations:


-completed abx.





Melchor Blake MD, FACP


[facility] ([facility])


O: [phone]


F: [phone]





Subjective


Date of service: 03/04/21


Principal diagnosis: ESRD on HD


Interval history: 


No fever. Awake, alert. 





Objective





- Exam


Narrative Exam: 





Physical Exam: 


Constitutional: awake, alert


Head, Ears, Nose: Normocephalic, atraumatic. External ears, nose normal


Eyes: Conjunctivae/corneas clear. No icterus. No ptosis.


Neck: Supple, no meningeal signs


Cardiovascular: S1, S2 normal. 


Respiratory: Good air entry, clear to auscultation bilaterally


GI: Soft, non-tender; bowel sounds normal. No peritoneal signs


Musculoskeletal: No pedal edema, no cyanosis.


Skin: No rash or abscess


Hem/Lymphatic: No palpable cervical or supraclavicular nodes. No lymphangitis


Psych: No agitation


Neurological: awake, alert 





- Constitutional


Vitals: 


                                   Vital Signs











Temp Pulse Resp BP Pulse Ox


 


 97.6 F   78   18   81/47   99 


 


 03/04/21 14:21  03/04/21 14:26  03/04/21 14:21  03/04/21 14:26  03/04/21 14:21








                           Temperature -Last 24 Hours











Temperature                    97.6 F


 


Temperature                    97.8 F


 


Temperature                    97.8 F


 


Temperature                    97.4 F


 


Temperature                    97.6 F


 


Temperature                    97.9 F

















- Labs


CBC & Chem 7: 


                                 02/28/21 04:36





                                 03/02/21 13:11


Labs: 


                              Abnormal lab results











  03/03/21 03/04/21 03/04/21 Range/Units





  11:34 07:28 08:08 


 


POC Glucose  67 L  66 L  58 L  ()  mg/dL

## 2021-03-05 LAB
BUN SERPL-MCNC: 13 MG/DL (ref 7–17)
BUN/CREAT SERPL: 3 %
CALCIUM SERPL-MCNC: 7.4 MG/DL (ref 8.4–10.2)
HEMOLYSIS INDEX: 43

## 2021-03-05 PROCEDURE — 5A1D70Z PERFORMANCE OF URINARY FILTRATION, INTERMITTENT, LESS THAN 6 HOURS PER DAY: ICD-10-PCS | Performed by: INTERNAL MEDICINE

## 2021-03-05 RX ADMIN — CLOPIDOGREL BISULFATE SCH MG: 75 TABLET ORAL at 14:19

## 2021-03-05 RX ADMIN — GABAPENTIN SCH MG: 100 CAPSULE ORAL at 21:39

## 2021-03-05 RX ADMIN — DEXTROSE MONOHYDRATE PRN ML: 25 INJECTION, SOLUTION INTRAVENOUS at 07:53

## 2021-03-05 RX ADMIN — FAMOTIDINE SCH MG: 20 TABLET ORAL at 14:19

## 2021-03-05 RX ADMIN — Medication SCH ML: at 14:20

## 2021-03-05 RX ADMIN — INSULIN LISPRO SCH: 100 INJECTION, SOLUTION INTRAVENOUS; SUBCUTANEOUS at 21:39

## 2021-03-05 RX ADMIN — ASPIRIN SCH MG: 81 TABLET, CHEWABLE ORAL at 14:20

## 2021-03-05 RX ADMIN — INSULIN LISPRO SCH: 100 INJECTION, SOLUTION INTRAVENOUS; SUBCUTANEOUS at 13:13

## 2021-03-05 RX ADMIN — GABAPENTIN SCH MG: 100 CAPSULE ORAL at 14:20

## 2021-03-05 RX ADMIN — Medication SCH ML: at 21:42

## 2021-03-05 RX ADMIN — METOPROLOL TARTRATE SCH: 50 TABLET, FILM COATED ORAL at 08:50

## 2021-03-05 RX ADMIN — METOPROLOL TARTRATE SCH MG: 50 TABLET, FILM COATED ORAL at 14:19

## 2021-03-05 RX ADMIN — EPOETIN ALFA-EPBX PRN UNIT: 10000 INJECTION, SOLUTION INTRAVENOUS; SUBCUTANEOUS at 11:41

## 2021-03-05 RX ADMIN — CINACALCET HYDROCHLORIDE SCH MG: 30 TABLET, FILM COATED ORAL at 14:19

## 2021-03-05 RX ADMIN — INSULIN LISPRO SCH: 100 INJECTION, SOLUTION INTRAVENOUS; SUBCUTANEOUS at 07:40

## 2021-03-05 RX ADMIN — INSULIN LISPRO SCH: 100 INJECTION, SOLUTION INTRAVENOUS; SUBCUTANEOUS at 16:07

## 2021-03-05 RX ADMIN — METOPROLOL TARTRATE SCH MG: 50 TABLET, FILM COATED ORAL at 21:38

## 2021-03-05 NOTE — PROGRESS NOTE
Assessment and Plan





Cultures:


2/27/2021 blood culture: No growth


2/28/2021 COVID-19 PCR: Negative


Hepatitis panel: Non-reactive





Assessment: 50 years old female with history of ESRD on hemodialysis, diabetes 

mellitus, CVA, SVT, admitted on 2/27/2021 secondary to altered mental status for

24 hours and right eye edema:





#Sepsis: Present on admission with fever, tachycardia, elevated lactate; unclear

etiology. UA negative. No leucocytosis, only 1 fever.





#Bilateral pneumonia v/s volume overload: Patient had missed hemodialysis 

sessions x2.  COVID-19 PCR negative, CXR without obvious infiltrate





#Right sided mild proptosis: No erythema seen, conjunctival minimally injected, 

unclear if this was trauma versus preseptal cellulitis.  Facial CT showed right 

preseptal periorbital soft tissue swelling. Seems to have resolved





#Acute hypoxemic respiratory failure:  Likely secondary to pneumonia versus 

volume overload.





#ESRD: Missed hemodialysis x 2 prior to admission.





#Elevated D-dimer: VQ negative for PE.





#Acute encephalopathy: Likely metabolic/uremic.  CT of head unremarkable.  

Improved. 





#Right index tenderness: ?Previous surgery.  X-ray with foreign body.





Recommendations:


-afebrile, off abx. 





ID will sign off. Plesae call with questions. 





Melchor Blake MD, FACP


[facility] ([facility]


O: [phone]


F: [phone]





Subjective


Date of service: 03/05/21


Principal diagnosis: ESRD on HD


Interval history: 


No fever. Awake, alert. Seen at dialysis, complaining of itching.





Objective





- Exam


Narrative Exam: 





Physical Exam: 


Constitutional: awake, alert


Head, Ears, Nose: Normocephalic, atraumatic. External ears, nose normal


Eyes: Conjunctivae/corneas clear. No icterus. No ptosis.


Neck: Supple, no meningeal signs


Cardiovascular: S1, S2 normal. 


Respiratory: Good air entry, clear to auscultation bilaterally


GI: Soft, non-tender; bowel sounds normal. No peritoneal signs


Musculoskeletal: No pedal edema, no cyanosis.


Skin: No rash or abscess


Hem/Lymphatic: No palpable cervical or supraclavicular nodes. No lymphangitis


Psych: No agitation


Neurological: awake, alert  





- Constitutional


Vitals: 


                                   Vital Signs











Temp Pulse Resp BP Pulse Ox


 


 98.2 F   84   18   139/72   100 


 


 03/05/21 09:15  03/05/21 11:30  03/05/21 09:15  03/05/21 11:30  03/05/21 08:04








                           Temperature -Last 24 Hours











Temperature                    98.2 F


 


Temperature                    98.2 F


 


Temperature                    97.9 F


 


Temperature                    97.5 F


 


Temperature                    97.5 F


 


Temperature                    97.9 F


 


Temperature                    97.6 F

















- Labs


CBC & Chem 7: 


                                 02/28/21 04:36





                                 03/05/21 05:27


Labs: 


                              Abnormal lab results











  03/04/21 03/05/21 03/05/21 Range/Units





  18:36 05:27 07:28 


 


Sodium   136 L   (137-145)  mmol/L


 


Chloride  97.4 L  96.3 L   ()  mmol/L


 


Creatinine  4.2 H  4.9 H   (0.6-1.2)  mg/dL


 


POC Glucose    61 L  ()  mg/dL


 


Calcium  7.8 L  7.4 L   (8.4-10.2)  mg/dL














  03/05/21 Range/Units





  08:21 


 


Sodium   (137-145)  mmol/L


 


Chloride   ()  mmol/L


 


Creatinine   (0.6-1.2)  mg/dL


 


POC Glucose  145 H  ()  mg/dL


 


Calcium   (8.4-10.2)  mg/dL

## 2021-03-05 NOTE — PROGRESS NOTE
Assessment and Plan


(Principal problem)-Altered mental Status


Hyperkalemia


SVT


End Stage Renal Disease


Hypertension


Anemia


COVID-19 R/O








Plan: 


-HD today for clearance and volume removal


-Fluid restriction of 1 liter per day


-Low potassium diet


-Monitor I/O's


-Obtain daily weights


-Assess dialysis needs daily











Subjective


Date of service: 03/05/21


Principal diagnosis: ESRD on HD


Interval history: 


had tachycardia this AM but now resolved, no acute distress





Objective





- Vital Signs


Vital signs: 


                               Vital Signs - 12hr











  03/04/21 03/05/21 03/05/21





  23:51 05:09 05:27


 


Temperature 97.5 F L  


 


Pulse Rate  159 H 79


 


Respiratory 17  





Rate   


 


Blood Pressure 96/53  


 


O2 Sat by Pulse   





Oximetry   














  03/05/21 03/05/21





  05:28 08:04


 


Temperature 97.9 F 98.2 F


 


Pulse Rate 77 75


 


Respiratory 16 18





Rate  


 


Blood Pressure 106/62 102/59


 


O2 Sat by Pulse 94 100





Oximetry  














- Lab





                                 02/28/21 04:36





                                 03/05/21 05:27


                             Most recent lab results











Calcium  7.4 mg/dL (8.4-10.2)  L  03/05/21  05:27    


 


Phosphorus  6.80 mg/dL (2.5-4.5)  H  03/01/21  04:28    


 


Magnesium  2.00 mg/dL (1.7-2.3)   03/04/21  18:36    














Medications & Allergies





- Medications


Allergies/Adverse Reactions: 


                                    Allergies





diphenhydramine [From Benadryl] Allergy (Verified 08/05/19 02:48)


   Unknown


lisinopril Allergy (Verified 08/05/19 02:48)


   Unknown


seafood Allergy (Uncoded 04/26/20 11:58)


   Angioedema








Home Medications: 


                                Home Medications











 Medication  Instructions  Recorded  Confirmed  Last Taken  Type


 


Gabapentin 100 mg PO BID 03/14/20 03/02/21 03/08/20 History


 


traMADoL [Ultram 50 MG tab] 50 mg PO PRN 03/14/20 03/02/21 Unknown History


 


Cinacalcet HCl [Sensipar] 30 mg PO QDAY #30 04/26/20 03/02/21 Unknown Rx


 


Epoetin Jayy 10,000 Unit [Procrit] 10,000 unit IV SAVANNAH PRN #1 vial 04/26/20 03/02/21 Unknown Rx


 


Famotidine [Pepcid] 20 mg PO QDAY #30 04/26/20 03/02/21 Unknown Rx


 


cloNIDine 0.1 mg PO QDAY #30 04/26/20 03/02/21 03/08/20 Rx


 


Aspirin [Aspirin BABY CHEW TAB] 81 mg PO QDAY #30 tab.chew 10/19/20 03/02/21 

Unknown Rx


 


AtorvaSTATin [Lipitor] 20 mg PO QHS #30 tablet 10/19/20 03/02/21 Unknown Rx


 


Clopidogrel [Plavix] 75 mg PO QDAY #30 tablet 10/19/20 03/02/21 Unknown Rx


 


ISOSORBIDE MONOnitrate [Imdur ER] 30 mg PO QDAY #30 tablet 10/19/20 03/02/21 

Unknown Rx


 


Metoprolol Xl [Metoprolol 25 mg PO QDAY #30 tablet 10/19/20 03/02/21 Unknown Rx





SUCCINATE ER TAB]     











Active Medications: 














Generic Name Dose Route Start Last Admin





  Trade Name Freq  PRN Reason Stop Dose Admin


 


Acetaminophen  650 mg  02/27/21 22:22 





  Acetaminophen 325 Mg Tab  PO  





  Q4H PRN  





  Pain MILD(1-3)/Fever >100.5/HA  


 


Aspirin  81 mg  02/28/21 10:00  03/04/21 10:31





  Aspirin 81 Mg Tab Chew  PO   81 mg





  QDAY THU   Administration


 


Atorvastatin Calcium  20 mg  02/28/21 22:00  03/04/21 21:06





  Atorvastatin 20 Mg Tab  PO   20 mg





  QHS THU   Administration


 


Cinacalcet  30 mg  02/28/21 10:00  03/04/21 10:31





  Cinacalcet 30 Mg Tab  PO   30 mg





  QDAY THU   Administration


 


Clonidine HCl  0.1 mg  02/28/21 10:00  03/04/21 10:31





  Clonidine 0.1 Mg Tab  PO   0.1 mg





  QDAY THU   Administration


 


Clopidogrel Bisulfate  75 mg  02/28/21 10:00  03/04/21 10:31





  Clopidogrel 75 Mg Tab  PO   75 mg





  QDAY THU   Administration


 


Dextrose  50 ml  02/27/21 22:22  03/05/21 07:53





  Dextrose 50% In Water (25gm) 50 Ml Syringe  IV   50 ml





  Q30MIN PRN   Administration





  Hypoglycemia  





  Protocol  


 


Famotidine  20 mg  02/28/21 10:00  03/04/21 10:31





  Famotidine 20 Mg Tab  PO   20 mg





  QDAY THU   Administration


 


Gabapentin  100 mg  02/28/21 10:00  03/04/21 21:06





  Gabapentin 100 Mg Cap  PO   100 mg





  BID THU   Administration


 


Insulin Human Lispro  0 unit  02/28/21 07:30  03/05/21 07:40





  Insulin Lispro 100 Unit/Ml  SUB-Q   Not Given





  [facility]  





  Protocol  


 


Isosorbide Mononitrate  30 mg  02/28/21 10:00  03/04/21 10:32





  Isosorbide Mononitrate Er 30 Mg Tab  PO   30 mg





  QDAY THU   Administration


 


Magnesium Hydroxide  30 ml  02/27/21 22:22 





  Magnesium Hydroxide (Mom) Oral Liqd Udc  PO  





  Q4H PRN  





  Constipation  


 


Metoprolol Tartrate  50 mg  03/02/21 14:00  03/05/21 08:50





  Metoprolol Tartrate 50 Mg Tab  PO   Not Given





  TID [facility]  


 


Morphine Sulfate  2 mg  02/27/21 22:22 





  Morphine 2 Mg/1 Ml Inj  IV  





  Q4H PRN  





  Pain, Moderate (4-6)  


 


Ondansetron HCl  4 mg  02/27/21 22:22 





  Ondansetron 4 Mg/2 Ml Inj  IV  





  Q8H PRN  





  Nausea And Vomiting  


 


Sodium Chloride  10 ml  02/28/21 10:00  03/04/21 21:07





  Sodium Chloride 0.9% 10 Ml Flush Syringe  IV   10 ml





  BID THU   Administration


 


Sodium Chloride  10 ml  02/27/21 22:22  03/02/21 06:41





  Sodium Chloride 0.9% 10 Ml Flush Syringe  IV   10 ml





  PRN PRN   Administration





  LINE FLUSH

## 2021-03-06 LAB
BUN SERPL-MCNC: 6 MG/DL (ref 7–17)
BUN/CREAT SERPL: 2 %
CALCIUM SERPL-MCNC: 7.8 MG/DL (ref 8.4–10.2)
HEMOLYSIS INDEX: 13

## 2021-03-06 RX ADMIN — GABAPENTIN SCH MG: 100 CAPSULE ORAL at 10:10

## 2021-03-06 RX ADMIN — METOPROLOL TARTRATE SCH MG: 50 TABLET, FILM COATED ORAL at 08:00

## 2021-03-06 RX ADMIN — GABAPENTIN SCH MG: 100 CAPSULE ORAL at 21:28

## 2021-03-06 RX ADMIN — INSULIN LISPRO SCH: 100 INJECTION, SOLUTION INTRAVENOUS; SUBCUTANEOUS at 16:40

## 2021-03-06 RX ADMIN — FAMOTIDINE SCH MG: 20 TABLET ORAL at 10:10

## 2021-03-06 RX ADMIN — CINACALCET HYDROCHLORIDE SCH MG: 30 TABLET, FILM COATED ORAL at 10:10

## 2021-03-06 RX ADMIN — INSULIN LISPRO SCH: 100 INJECTION, SOLUTION INTRAVENOUS; SUBCUTANEOUS at 08:08

## 2021-03-06 RX ADMIN — CLOPIDOGREL BISULFATE SCH MG: 75 TABLET ORAL at 10:12

## 2021-03-06 RX ADMIN — METOPROLOL TARTRATE SCH: 50 TABLET, FILM COATED ORAL at 21:28

## 2021-03-06 RX ADMIN — METOPROLOL TARTRATE SCH MG: 50 TABLET, FILM COATED ORAL at 14:00

## 2021-03-06 RX ADMIN — Medication SCH ML: at 21:29

## 2021-03-06 RX ADMIN — INSULIN LISPRO SCH: 100 INJECTION, SOLUTION INTRAVENOUS; SUBCUTANEOUS at 21:29

## 2021-03-06 RX ADMIN — ASPIRIN SCH MG: 81 TABLET, CHEWABLE ORAL at 10:10

## 2021-03-06 RX ADMIN — INSULIN LISPRO SCH: 100 INJECTION, SOLUTION INTRAVENOUS; SUBCUTANEOUS at 12:37

## 2021-03-06 RX ADMIN — Medication SCH ML: at 10:11

## 2021-03-06 NOTE — PROGRESS NOTE
Assessment and Plan





Assessment


(Principal problem)-Altered mental Status


ESRD on HD


Hyperkalemia


Hypokalemia


SVT


Hypertension


Anemia


Diarrhea











Plan: 





-S/p HD yesterday for UF and clearance, UF removed 2L


-Plan d/w hospitalist, possible discharge tomorrow. Since pt undergoes OP HD 

TTS, was going to dialyze pt again today, but pt refused, states she doesn't 

want to undergo HD today


-Repeat serum K+ level at 1600, will replace if needed


-Assess need for HD on daily basis


-Fluid restriction of 1 liter per day


-Monitor I/O's


-This pt undergoes outpatient HD at [facility] TTS


-Renal plan d/w Dr Zhu





Subjective


Principal diagnosis: ESRD on HD


Interval history: 





Pt seen in bed, awake, denies shortness of breath, no acute distress





Objective





- Vital Signs


Vital signs: 


                               Vital Signs - 12hr











  03/06/21 03/06/21 03/06/21





  04:49 07:39 10:00


 


Temperature 98.0 F 97.9 F 


 


Pulse Rate 74 81 


 


Pulse Rate [   74





Dorsalis Pedis]   


 


Pulse Rate [   74





Left Radial]   


 


Pulse Rate [   74





Posterior   





Tibial]   


 


Pulse Rate [   74





Right Radial]   


 


Respiratory 16 16 18





Rate   


 


Blood Pressure 98/57 125/77 


 


O2 Sat by Pulse 98 100 





Oximetry   














  03/06/21 03/06/21 03/06/21





  10:06 10:11 10:59


 


Temperature   98.0 F


 


Pulse Rate  74 94 H


 


Pulse Rate [   





Dorsalis Pedis]   


 


Pulse Rate [   





Left Radial]   


 


Pulse Rate [   





Posterior   





Tibial]   


 


Pulse Rate [   





Right Radial]   


 


Respiratory   16





Rate   


 


Blood Pressure 112/71 111/71 140/86


 


O2 Sat by Pulse   90





Oximetry   














- General Appearance


General appearance: other (awake)


EENT: ATNC


Neck: no JVD


Respiratory: Present: Decreased Breath Sounds


Cardiology: regular, S1S2, other (ACCESS: Left AVF + thrill and bruit noted)


Gastrointestinal: normoactive bowel sounds


Integumentary: warm and dry


Neurologic: other (awake, oriented to person, place, and year, follows simple 

commands)


Musculoskeletal: other (no edema to BLE)





- Lab





                                 02/28/21 04:36





                                 03/06/21 07:05


                             Most recent lab results











Calcium  7.8 mg/dL (8.4-10.2)  L  03/06/21  07:05    


 


Phosphorus  6.80 mg/dL (2.5-4.5)  H  03/01/21  04:28    


 


Magnesium  2.00 mg/dL (1.7-2.3)   03/04/21  18:36    














Medications & Allergies





- Medications


Allergies/Adverse Reactions: 


                                    Allergies





diphenhydramine [From Benadryl] Allergy (Verified 08/05/19 02:48)


   Unknown


lisinopril Allergy (Verified 08/05/19 02:48)


   Unknown


seafood Allergy (Uncoded 04/26/20 11:58)


   Angioedema








Home Medications: 


                                Home Medications











 Medication  Instructions  Recorded  Confirmed  Last Taken  Type


 


Gabapentin 100 mg PO BID 03/14/20 03/02/21 03/08/20 History


 


traMADoL [Ultram 50 MG tab] 50 mg PO PRN 03/14/20 03/02/21 Unknown History


 


Cinacalcet HCl [Sensipar] 30 mg PO QDAY #30 04/26/20 03/02/21 Unknown Rx


 


Epoetin Jayy 10,000 Unit [Procrit] 10,000 unit IV SAVANNAH PRN #1 vial 04/26/20 03/02/21 Unknown Rx


 


Famotidine [Pepcid] 20 mg PO QDAY #30 04/26/20 03/02/21 Unknown Rx


 


cloNIDine 0.1 mg PO QDAY #30 04/26/20 03/02/21 03/08/20 Rx


 


Aspirin [Aspirin BABY CHEW TAB] 81 mg PO QDAY #30 tab.chew 10/19/20 03/02/21 

Unknown Rx


 


AtorvaSTATin [Lipitor] 20 mg PO QHS #30 tablet 10/19/20 03/02/21 Unknown Rx


 


Clopidogrel [Plavix] 75 mg PO QDAY #30 tablet 10/19/20 03/02/21 Unknown Rx


 


ISOSORBIDE MONOnitrate [Imdur ER] 30 mg PO QDAY #30 tablet 10/19/20 03/02/21 

Unknown Rx


 


Metoprolol Xl [Metoprolol 25 mg PO QDAY #30 tablet 10/19/20 03/02/21 Unknown Rx





SUCCINATE ER TAB]     











Active Medications: 














Generic Name Dose Route Start Last Admin





  Trade Name Freq  PRN Reason Stop Dose Admin


 


Acetaminophen  650 mg  02/27/21 22:22 





  Acetaminophen 325 Mg Tab  PO  





  Q4H PRN  





  Pain MILD(1-3)/Fever >100.5/HA  


 


Aspirin  81 mg  02/28/21 10:00  03/06/21 10:10





  Aspirin 81 Mg Tab Chew  PO   81 mg





  QDAY THU   Administration


 


Atorvastatin Calcium  20 mg  02/28/21 22:00  03/05/21 21:45





  Atorvastatin 20 Mg Tab  PO   20 mg





  [facility] THU   Administration


 


Cinacalcet  30 mg  02/28/21 10:00  03/06/21 10:10





  Cinacalcet 30 Mg Tab  PO   30 mg





  QDAY THU   Administration


 


Clonidine HCl  0.1 mg  02/28/21 10:00  03/06/21 10:11





  Clonidine 0.1 Mg Tab  PO   Not Given





  QDAY THU  


 


Clopidogrel Bisulfate  75 mg  02/28/21 10:00  03/06/21 10:12





  Clopidogrel 75 Mg Tab  PO   75 mg





  QDAY [facility]   Administration


 


Dextrose  50 ml  02/27/21 22:22  03/05/21 07:53





  Dextrose 50% In Water (25gm) 50 Ml Syringe  IV   50 ml





  Q30MIN PRN   Administration





  Hypoglycemia  





  Protocol  


 


Famotidine  20 mg  02/28/21 10:00  03/06/21 10:10





  Famotidine 20 Mg Tab  PO   20 mg





  QDAY [facility]   Administration


 


Gabapentin  100 mg  02/28/21 10:00  03/06/21 10:10





  Gabapentin 100 Mg Cap  PO   100 mg





  BID THU   Administration


 


Insulin Human Lispro  0 unit  02/28/21 07:30  03/06/21 12:37





  Insulin Lispro 100 Unit/Ml  SUB-Q   Not Given





  [facility]  





  Protocol  


 


Isosorbide Mononitrate  30 mg  02/28/21 10:00  03/06/21 10:11





  Isosorbide Mononitrate Er 30 Mg Tab  PO   Not Given





  QDAY [facility]  


 


Magnesium Hydroxide  30 ml  02/27/21 22:22 





  Magnesium Hydroxide (Mom) Oral Liqd Udc  PO  





  Q4H PRN  





  Constipation  


 


Metoprolol Tartrate  50 mg  03/02/21 14:00  03/06/21 08:00





  Metoprolol Tartrate 50 Mg Tab  PO   50 mg





  TID THU   Administration


 


Morphine Sulfate  2 mg  02/27/21 22:22 





  Morphine 2 Mg/1 Ml Inj  IV  





  Q4H PRN  





  Pain, Moderate (4-6)  


 


Ondansetron HCl  4 mg  02/27/21 22:22 





  Ondansetron 4 Mg/2 Ml Inj  IV  





  Q8H PRN  





  Nausea And Vomiting  


 


Sodium Chloride  10 ml  02/28/21 10:00  03/06/21 10:11





  Sodium Chloride 0.9% 10 Ml Flush Syringe  IV   10 ml





  BID THU   Administration


 


Sodium Chloride  10 ml  02/27/21 22:22  03/02/21 06:41





  Sodium Chloride 0.9% 10 Ml Flush Syringe  IV   10 ml





  PRN PRN   Administration





  LINE FLUSH

## 2021-03-06 NOTE — PROGRESS NOTE
Assessment and Plan





ssessment and Plan


Assessment and plan: 


--Dysphagia/patient refusing to eat 


Current Visit: Yes   Status: Acute   


Plan to address problem: 


Follow speech therapy evaluation


Dobbhoff placement for medications and tube feeding


If needed, continue supportive care


Dysphagia continues we will plan for PEG


Patient altered because of sepsis


Will decide in the next 24 to 48 hours





--Acute toxic metabolic encephalopathy


Current Visit: Yes   Status: Acute   


Plan to address problem: 


Multifactorial, uremia, severe hyperkalemia.  


SVT and possible COVID-19


Treat the underlying cause, supportive care


Neurochecks


CT ;head negative for acute abnormality


CT face; periorbital swelling





--Right eye lid and facial swelling:


Current Visit: Yes   Status: Acute . 


Plan to address problem: 


 Slightly improved, continue current management





--Severe sepsis:


Current Visit: Yes   Status: Acute . 


Plan to address problem: 


Fever tachycardia lactic acidosis, altered level of consciousness


ID following, empiric antibiotics cefepime Vanco and metronidazole


Follow cultures





--Hyperkalemia


Current Visit: Yes   Status: Acute .  


Plan to address problem: 


Patient  received insulin and glucose, calcium gluconate and sodium bicarb.


This morning's potassium is 5.6, calcium gluconate 1 dose


Hemodialysis, nephrology consulted





--ESRD on hemodialysis TTS


Current Visit: Yes   Status: Chronic   


Plan to address problem: 


Hemodialysis per schedule


Nephrology consulted





--Noncompliance with HD


Current Visit: Yes   Status: Acute   


Plan to address problem: 


Patient strongly advised to comply with medications diet 


follow-up visit especially hemodialysis per schedule





--SVT (supraventricular tachycardia)


Current Visit: Yes   Status: Acute   


Plan to address problem: 


Patient received adenosine, 


Resumed home medication metoprolol


Cardiology consulted





--Chronic elevation of troponin/NSTEMI 2


Current Visit: Yes   Status: Acute   


Plan to address problem: 


Probably due to ESRD


However patient has multiple risk factors.  


Cardiology consulted





--PUI /negative  COVID-19 test


Current Visit: Yes   Status: Acute   


Plan to address problem: 


DC isolation, corona PCR test negative





--DM2 (diabetes mellitus, type 2)


Current Visit: No   Status: Chronic   


Plan to address problem: 


Accu-Chek sliding scale coverage ADA diet


Insulin as needed





--Hypertensive urgency; POA


Current Visit: No   Status: Chronic.  


Now moderate control, continue current antihypertensives


As needed hydralazine   





--DVT prophylaxis


Current Visit: No   Status: Acute   


Plan to address problem: 


Continue subcu heparin renal dose





--Full code status


Current Visit: Yes   Status: Acute   


Plan to address problem: 





Subjective


Date of service: 03/03/21


Principal diagnosis: ESRD on HD


Interval history: 








Brief history


50-year-old -American female with known history of end-stage renal 

disease on dialysis, diabetes mellitus, CVA and history of SVT presenting to the

emergency room today via EMS with altered mental status.  Upon arrival in the e

mergency room patient was found to be in SVT and also had a fever.  Temperature 

was said to be about 102 F.  Patient received 6 mg of adenosine and 

subsequently 12 mg of adenosine in route to the hospital.  Subsequent rhythm was

said to have been a sinus tachycardia.  She had an initial heart rate of about 

200 which later improved to about 140.


Patient was evaluated by infectious diseases, nephrology, received hemodialysis 

per schedule, patient symptoms slightly improved





3/1; patient is more alert and awake facial swelling slightly improved, on 

empiric antibiotics per ID


Refusing to eat, speech therapy consulted, follow speech and swallow, Dobbhoff 

placement for medications and feeds if no improvement





3/2/2021


Patient lethargic and unable to eat





3/3/2021


Patient lethargic and unable to eat





History


Interval history: 


I have seen and examined the patient at the bedside


Patient's chart and medications reviewed


Patient received hemodialysis today


More alert and awake, minimally communicative


Refusing to take oral


Pending speech therapy evaluation




















Objective





- Constitutional


Vitals: 


                               Vital Signs - 12hr











  03/06/21 03/06/21 03/06/21





  04:49 07:39 10:00


 


Temperature 98.0 F 97.9 F 


 


Pulse Rate 74 81 


 


Pulse Rate [   74





Dorsalis Pedis]   


 


Pulse Rate [   74





Left Radial]   


 


Pulse Rate [   74





Posterior   





Tibial]   


 


Pulse Rate [   74





Right Radial]   


 


Respiratory 16 16 18





Rate   


 


Blood Pressure 98/57 125/77 


 


O2 Sat by Pulse 98 100 





Oximetry   














  03/06/21 03/06/21 03/06/21





  10:06 10:11 10:59


 


Temperature   98.0 F


 


Pulse Rate  74 94 H


 


Pulse Rate [   





Dorsalis Pedis]   


 


Pulse Rate [   





Left Radial]   


 


Pulse Rate [   





Posterior   





Tibial]   


 


Pulse Rate [   





Right Radial]   


 


Respiratory   16





Rate   


 


Blood Pressure 112/71 111/71 140/86


 


O2 Sat by Pulse   90





Oximetry   











General appearance: Present: no acute distress, well-nourished





- EENT


Eyes: PERRL, EOM intact


ENT: hearing intact, clear oral mucosa


Ears: bilateral: normal





- Neck


Neck: supple, normal ROM





- Respiratory


Respiratory effort: normal


Respiratory: bilateral: CTA





- Breasts


Breasts: normal





- Cardiovascular


Heart rate: 78


Rhythm: regular


Heart Sounds: Present: S1 & S2.  Absent: gallop, rub


Extremities: pulses intact, No edema, normal color, Full ROM





- Gastrointestinal


General gastrointestinal: Present: soft, non-tender, non-distended, normal bowel

sounds





- Genitourinary


Female genitourinary: normal





- Integumentary


Integumentary: clear, warm, dry





- Musculoskeletal


Musculoskeletal: 1, strength equal bilaterally





- Neurologic


Neurologic: moves all extremities





- Psychiatric


Psychiatric: memory intact, appropriate mood/affect, intact judgment & insight





- Labs


CBC & Chem 7: 


                                 03/08/21 06:58





                                 03/08/21 06:58


Labs: 


                              Abnormal lab results











  03/06/21 Range/Units





  07:05 


 


Potassium  3.3 L  (3.6-5.0)  mmol/L


 


BUN  6 L  (7-17)  mg/dL


 


Creatinine  3.4 H  (0.6-1.2)  mg/dL


 


Calcium  7.8 L  (8.4-10.2)  mg/dL














HEART Score





- HEART Score


Troponin: 


                                        











Troponin T  0.395 ng/mL (0.00-0.029)  H* D 03/02/21  13:11

## 2021-03-06 NOTE — PROGRESS NOTE
Assessment and Plan





ssessment and Plan


Assessment and plan: 


--Dysphagia/patient refusing to eat 


Current Visit: Yes   Status: Acute   


Plan to address problem: 


Patient more alert and oriented


No need for PEG tube


Able to eat normally





--Acute toxic metabolic encephalopathy


Current Visit: Yes   Status: Acute   


Plan to address problem: 


Encephalopathy resolved








--Diarrhea


C. difficile to be ruled out


Oral Flagyl after stool specimen is collected





--Severe sepsis:


Current Visit: Yes   Status: Acute . 


Plan to address problem: 


Fever tachycardia lactic acidosis, altered level of consciousness


ID following, empiric antibiotics cefepime Vanco and metronidazole


Follow cultures





--Hyperkalemia


Current Visit: Yes   Status: Acute .  


Plan to address problem: 


Patient  received insulin and glucose, calcium gluconate and sodium bicarb.


This morning's potassium is 5.6, calcium gluconate 1 dose


Hemodialysis, nephrology consulted





--ESRD on hemodialysis TTS


Current Visit: Yes   Status: Chronic   


Plan to address problem: 


Hemodialysis per schedule


Nephrology consulted





--Noncompliance with HD


Current Visit: Yes   Status: Acute   


Plan to address problem: 


Patient strongly advised to comply with medications diet 


follow-up visit especially hemodialysis per schedule





--SVT (supraventricular tachycardia)


Current Visit: Yes   Status: Acute   


Plan to address problem: 


Patient received adenosine, 


Resumed home medication metoprolol


Cardiology consulted





--Chronic elevation of troponin/NSTEMI 2


Current Visit: Yes   Status: Acute   


Plan to address problem: 


Probably due to ESRD


However patient has multiple risk factors.  


Cardiology consulted





--PUI /negative  COVID-19 test


Current Visit: Yes   Status: Acute   


Plan to address problem: 


DC isolation, corona PCR test negative





--DM2 (diabetes mellitus, type 2)


Current Visit: No   Status: Chronic   


Plan to address problem: 


Accu-Chek sliding scale coverage ADA diet


Insulin as needed





--Hypertensive urgency; POA


Current Visit: No   Status: Chronic.  


Now moderate control, continue current antihypertensives


As needed hydralazine   





--DVT prophylaxis


Current Visit: No   Status: Acute   


Plan to address problem: 


Continue subcu heparin renal dose





--Full code status


Current Visit: Yes   Status: Acute   


Plan to address problem: 





Subjective


Date of service: 03/04/21


Principal diagnosis: ESRD on HD


Interval history: 








Brief history


50-year-old -American female with known history of end-stage renal 

disease on dialysis, diabetes mellitus, CVA and history of SVT presenting to the

emergency room today via EMS with altered mental status.  Upon arrival in the 

emergency room patient was found to be in SVT and also had a fever.  Temperature

was said to be about 102 F.  Patient received 6 mg of adenosine and 

subsequently 12 mg of adenosine in route to the hospital.  Subsequent rhythm was

said to have been a sinus tachycardia.  She had an initial heart rate of about 

200 which later improved to about 140.


Patient was evaluated by infectious diseases, nephrology, received hemodialysis 

per schedule, patient symptoms slightly improved





3/1; patient is more alert and awake facial swelling slightly improved, on 

empiric antibiotics per ID


Refusing to eat, speech therapy consulted, follow speech and swallow, Dobbhoff 

placement for medications and feeds if no improvement





3/2/2021


Patient lethargic and unable to eat





3/3/2021


Patient lethargic and unable to eat





3/4/2021


More alert and oriented


Has loose bowel movements 4-5 in a day


Otherwise ready for discharge





History


Interval history: 


I have seen and examined the patient at the bedside


Patient's chart and medications reviewed


Patient received hemodialysis today


More alert and awake, minimally communicative


Refusing to take oral


Pending speech therapy evaluation




















Objective





- Constitutional


Vitals: 


                               Vital Signs - 12hr











  03/06/21 03/06/21 03/06/21





  04:49 07:39 10:00


 


Temperature 98.0 F 97.9 F 


 


Pulse Rate 74 81 


 


Pulse Rate [   74





Dorsalis Pedis]   


 


Pulse Rate [   74





Left Radial]   


 


Pulse Rate [   74





Posterior   





Tibial]   


 


Pulse Rate [   74





Right Radial]   


 


Respiratory 16 16 18





Rate   


 


Blood Pressure 98/57 125/77 


 


O2 Sat by Pulse 98 100 





Oximetry   














  03/06/21 03/06/21 03/06/21





  10:06 10:11 10:59


 


Temperature   98.0 F


 


Pulse Rate  74 94 H


 


Pulse Rate [   





Dorsalis Pedis]   


 


Pulse Rate [   





Left Radial]   


 


Pulse Rate [   





Posterior   





Tibial]   


 


Pulse Rate [   





Right Radial]   


 


Respiratory   16





Rate   


 


Blood Pressure 112/71 111/71 140/86


 


O2 Sat by Pulse   90





Oximetry   











General appearance: Present: no acute distress, well-nourished





- EENT


Eyes: PERRL, EOM intact


ENT: hearing intact, clear oral mucosa


Ears: bilateral: normal





- Neck


Neck: supple, normal ROM





- Respiratory


Respiratory effort: normal


Respiratory: bilateral: CTA





- Breasts


Breasts: normal





- Cardiovascular


Heart rate: 78


Rhythm: regular


Heart Sounds: Present: S1 & S2.  Absent: gallop, rub


Extremities: pulses intact, No edema, normal color, Full ROM





- Gastrointestinal


General gastrointestinal: Present: soft, non-tender, non-distended, normal bowel

sounds





- Genitourinary


Female genitourinary: normal





- Integumentary


Integumentary: clear, warm, dry





- Musculoskeletal


Musculoskeletal: 1, strength equal bilaterally





- Neurologic


Neurologic: moves all extremities





- Psychiatric


Psychiatric: memory intact, appropriate mood/affect, intact judgment & insight





- Labs


CBC & Chem 7: 


                                 03/08/21 06:58





                                 03/08/21 06:58


Labs: 


                              Abnormal lab results











  03/06/21 Range/Units





  07:05 


 


Potassium  3.3 L  (3.6-5.0)  mmol/L


 


BUN  6 L  (7-17)  mg/dL


 


Creatinine  3.4 H  (0.6-1.2)  mg/dL


 


Calcium  7.8 L  (8.4-10.2)  mg/dL














HEART Score





- HEART Score


Troponin: 


                                        











Troponin T  0.395 ng/mL (0.00-0.029)  H* D 03/02/21  13:11

## 2021-03-06 NOTE — PROGRESS NOTE
Assessment and Plan





ssessment and Plan


Assessment and plan: 


--Dysphagia/patient refusing to eat 


Improved





--Acute toxic metabolic encephalopathy


Current Visit: Yes   Status: Acute   


Plan to address problem: 


Resolved





--Diarrhea 


Current Visit: Yes   Status: Acute . 


C. difficile pending


Specimen sent


On oral Flagyl





--Severe sepsis:


Current Visit: Yes   Status: Acute . 


Plan to address problem: 


Continue IV antibiotics





--Hyperkalemia


Current Visit: Yes   Status: Acute .  


Plan to address problem: 


Resolved





--ESRD on hemodialysis TTS


Current Visit: Yes   Status: Chronic   


Plan to address problem: 


Hemodialysis per schedule


Nephrology consulted





--Noncompliance with HD


Current Visit: Yes   Status: Acute   


Plan to address problem: 


Patient strongly advised to comply with medications diet 


follow-up visit especially hemodialysis per schedule





--SVT (supraventricular tachycardia)


Current Visit: Yes   Status: Acute   


Plan to address problem: 


Improved


Ddress problem: 


Probably due to ESRD


However patient has multiple risk factors.  


Cardiology consulted





--PUI /negative  COVID-19 test


Current Visit: Yes   Status: Acute   


Plan to address problem: 


DC isolation, corona PCR test negative





--DM2 (diabetes mellitus, type 2)


Current Visit: No   Status: Chronic   


Plan to address problem: 


Accu-Chek sliding scale coverage ADA diet


Insulin as needed





--Hypertensive urgency; POA


Current Visit: No   Status: Chronic.  


Now moderate control, continue current antihypertensives


As needed hydralazine   





--DVT prophylaxis


Current Visit: No   Status: Acute   


Plan to address problem: 


Continue subcu heparin renal dose





--Full code status


Current Visit: Yes   Status: Acute   


Plan to address problem: 





Subjective


Date of service: 03/05/21


Principal diagnosis: ESRD on HD


Interval history: 








Brief history


50-year-old -American female with known history of end-stage renal 

disease on dialysis, diabetes mellitus, CVA and history of SVT presenting to the

emergency room today via EMS with altered mental status.  Upon arrival in the 

emergency room patient was found to be in SVT and also had a fever.  Temperature

was said to be about 102 F.  Patient received 6 mg of adenosine and 

subsequently 12 mg of adenosine in route to the hospital.  Subsequent rhythm was

said to have been a sinus tachycardia.  She had an initial heart rate of about 

200 which later improved to about 140.


Patient was evaluated by infectious diseases, nephrology, received hemodialysis 

per schedule, patient symptoms slightly improved





3/1; patient is more alert and awake facial swelling slightly improved, on 

empiric antibiotics per ID


Refusing to eat, speech therapy consulted, follow speech and swallow, Dobbhoff 

placement for medications and feeds if no improvement





3/2/2021


Patient lethargic and unable to eat





3/3/2021


Patient lethargic and unable to eat





3/4/2021


More alert and oriented


Has loose bowel movements 4-5 in a day


Otherwise ready for discharge





3/5/2021


Diarrhea persists


Altered sensorium resolved








History


Interval history: 


I have seen and examined the patient at the bedside


Patient's chart and medications reviewed


Patient received hemodialysis today


More alert and awake, minimally communicative


Refusing to take oral


Pending speech therapy evaluation




















Objective





- Constitutional


Vitals: 


                               Vital Signs - 12hr











  03/06/21 03/06/21 03/06/21





  04:49 07:39 10:00


 


Temperature 98.0 F 97.9 F 


 


Pulse Rate 74 81 


 


Pulse Rate [   74





Dorsalis Pedis]   


 


Pulse Rate [   74





Left Radial]   


 


Pulse Rate [   74





Posterior   





Tibial]   


 


Pulse Rate [   74





Right Radial]   


 


Respiratory 16 16 18





Rate   


 


Blood Pressure 98/57 125/77 


 


O2 Sat by Pulse 98 100 





Oximetry   














  03/06/21 03/06/21 03/06/21





  10:06 10:11 10:59


 


Temperature   98.0 F


 


Pulse Rate  74 94 H


 


Pulse Rate [   





Dorsalis Pedis]   


 


Pulse Rate [   





Left Radial]   


 


Pulse Rate [   





Posterior   





Tibial]   


 


Pulse Rate [   





Right Radial]   


 


Respiratory   16





Rate   


 


Blood Pressure 112/71 111/71 140/86


 


O2 Sat by Pulse   90





Oximetry   











General appearance: Present: no acute distress, well-nourished





- EENT


Eyes: PERRL, EOM intact


ENT: hearing intact, clear oral mucosa


Ears: bilateral: normal





- Neck


Neck: supple, normal ROM





- Respiratory


Respiratory effort: normal


Respiratory: bilateral: CTA





- Breasts


Breasts: normal





- Cardiovascular


Heart rate: 78


Rhythm: regular


Heart Sounds: Present: S1 & S2.  Absent: gallop, rub


Extremities: pulses intact, No edema, normal color, Full ROM





- Gastrointestinal


General gastrointestinal: Present: soft, non-tender, non-distended, normal bowel

sounds





- Genitourinary


Female genitourinary: normal





- Integumentary


Integumentary: clear, warm, dry





- Musculoskeletal


Musculoskeletal: 1, strength equal bilaterally





- Neurologic


Neurologic: moves all extremities





- Psychiatric


Psychiatric: memory intact, appropriate mood/affect, intact judgment & insight





- Labs


CBC & Chem 7: 


                                 03/08/21 06:58





                                 03/08/21 06:58


Labs: 


                              Abnormal lab results











  03/06/21 Range/Units





  07:05 


 


Potassium  3.3 L  (3.6-5.0)  mmol/L


 


BUN  6 L  (7-17)  mg/dL


 


Creatinine  3.4 H  (0.6-1.2)  mg/dL


 


Calcium  7.8 L  (8.4-10.2)  mg/dL














HEART Score





- HEART Score


Troponin: 


                                        











Troponin T  0.395 ng/mL (0.00-0.029)  H* D 03/02/21  13:11

## 2021-03-06 NOTE — PROGRESS NOTE
Assessment and Plan





ssessment and Plan


Assessment and plan: 


--Dysphagia/patient refusing to eat 


Patient eating normally


No need for PEG tube








--Acute toxic metabolic encephalopathy


Current Visit: Yes   Status: Acute   


Plan to address problem: 


Resolved


--Diarrhea


Current Visit: Yes   Status: Acute . 


Plan to address problem: 


Improved





--Severe sepsis:


Current Visit: Yes   Status: Acute . 


Plan to address problem: 


Continue antibiotics





--Hyperkalemia


Current Visit: Yes   Status: Acute .  


Plan to address problem: 


Resolved


--ESRD on hemodialysis TTS


Current Visit: Yes   Status: Chronic   


Plan to address problem: 


Continue hemodialysis as per TTS schedule





--Noncompliance with HD


Current Visit: Yes   Status: Acute   


Plan to address problem: 


Patient strongly advised to comply with medications diet 


follow-up visit especially hemodialysis per schedule





--SVT (supraventricular tachycardia)


Current Visit: Yes   Status: Acute   


Plan to address problem: 


Improved-





--PUI /negative  COVID-19 test


Current Visit: Yes   Status: Acute   


Plan to address problem: 


DC isolation, corona PCR test negative





--DM2 (diabetes mellitus, type 2)


Current Visit: No   Status: Chronic   


Plan to address problem: 


Accu-Chek sliding scale coverage ADA diet


Insulin as needed





--Hypertensive urgency; POA


Current Visit: No   Status: Chronic.  


Now moderate control, continue current antihypertensives


As needed hydralazine   





--DVT prophylaxis


Current Visit: No   Status: Acute   


Plan to address problem: 


Continue subcu heparin renal dose





--Full code status


Current Visit: Yes   Status: Acute   


Plan to address problem: 





Subjective


Date of service: 03/06/21


Principal diagnosis: ESRD on HD


Interval history: 








Brief history


50-year-old -American female with known history of end-stage renal 

disease on dialysis, diabetes mellitus, CVA and history of SVT presenting to the

emergency room today via EMS with altered mental status.  Upon arrival in the 

emergency room patient was found to be in SVT and also had a fever.  Temperature

was said to be about 102 F.  Patient received 6 mg of adenosine and 

subsequently 12 mg of adenosine in route to the hospital.  Subsequent rhythm was

said to have been a sinus tachycardia.  She had an initial heart rate of about 

200 which later improved to about 140.


Patient was evaluated by infectious diseases, nephrology, received hemodialysis 

per schedule, patient symptoms slightly improved





3/1; patient is more alert and awake facial swelling slightly improved, on 

empiric antibiotics per ID


Refusing to eat, speech therapy consulted, follow speech and swallow, Dobbhoff 

placement for medications and feeds if no improvement





3/2/2021


Patient lethargic and unable to eat





3/3/2021


Patient lethargic and unable to eat





3/4/2021


More alert and oriented


Has loose bowel movements 4-5 in a day


Otherwise ready for discharge





3/5/2021


Diarrhea persists


Altered sensorium resolved





3/6/2021


Nepro stopped


Diarrhea improved


Flagyl also discontinued


C. difficile came negative








History


Interval history: 


I have seen and examined the patient at the bedside


Patient's chart and medications reviewed


Patient received hemodialysis today


More alert and awake, minimally communicative


Refusing to take oral


Pending speech therapy evaluation




















Objective





- Constitutional


Vitals: 


                               Vital Signs - 12hr











  03/06/21 03/06/21 03/06/21





  04:49 07:39 10:00


 


Temperature 98.0 F 97.9 F 


 


Pulse Rate 74 81 


 


Pulse Rate [   74





Dorsalis Pedis]   


 


Pulse Rate [   74





Left Radial]   


 


Pulse Rate [   74





Posterior   





Tibial]   


 


Pulse Rate [   74





Right Radial]   


 


Respiratory 16 16 18





Rate   


 


Blood Pressure 98/57 125/77 


 


O2 Sat by Pulse 98 100 





Oximetry   














  03/06/21 03/06/21 03/06/21





  10:06 10:11 10:59


 


Temperature   98.0 F


 


Pulse Rate  74 94 H


 


Pulse Rate [   





Dorsalis Pedis]   


 


Pulse Rate [   





Left Radial]   


 


Pulse Rate [   





Posterior   





Tibial]   


 


Pulse Rate [   





Right Radial]   


 


Respiratory   16





Rate   


 


Blood Pressure 112/71 111/71 140/86


 


O2 Sat by Pulse   90





Oximetry   











General appearance: Present: no acute distress, well-nourished





- EENT


Eyes: PERRL, EOM intact


ENT: hearing intact, clear oral mucosa


Ears: bilateral: normal





- Neck


Neck: supple, normal ROM





- Respiratory


Respiratory effort: normal


Respiratory: bilateral: CTA





- Breasts


Breasts: normal





- Cardiovascular


Heart rate: 78


Rhythm: regular


Heart Sounds: Present: S1 & S2.  Absent: gallop, rub


Extremities: pulses intact, No edema, normal color, Full ROM





- Gastrointestinal


General gastrointestinal: Present: soft, non-tender, non-distended, normal bowel

sounds





- Genitourinary


Female genitourinary: normal





- Integumentary


Integumentary: clear, warm, dry





- Musculoskeletal


Musculoskeletal: 1, strength equal bilaterally





- Neurologic


Neurologic: moves all extremities





- Psychiatric


Psychiatric: memory intact, appropriate mood/affect, intact judgment & insight





- Labs


CBC & Chem 7: 


                                 03/08/21 06:58





                                 03/08/21 06:58


Labs: 


                              Abnormal lab results











  03/06/21 Range/Units





  07:05 


 


Potassium  3.3 L  (3.6-5.0)  mmol/L


 


BUN  6 L  (7-17)  mg/dL


 


Creatinine  3.4 H  (0.6-1.2)  mg/dL


 


Calcium  7.8 L  (8.4-10.2)  mg/dL














HEART Score





- HEART Score


Troponin: 


                                        











Troponin T  0.395 ng/mL (0.00-0.029)  H* D 03/02/21  13:11

## 2021-03-07 LAB
%HYPO/RBC NFR BLD AUTO: (no result) %
ALBUMIN SERPL-MCNC: 2.6 G/DL (ref 3.9–5)
ALT SERPL-CCNC: < 5 UNITS/L (ref 7–56)
BAND NEUTROPHILS # (MANUAL): 0 K/MM3
BUN SERPL-MCNC: 9 MG/DL (ref 7–17)
BUN/CREAT SERPL: 2 %
CALCIUM SERPL-MCNC: 7.8 MG/DL (ref 8.4–10.2)
HCT VFR BLD CALC: 28 % (ref 30.3–42.9)
HEMOLYSIS INDEX: 41
HGB BLD-MCNC: 9 GM/DL (ref 10.1–14.3)
MCHC RBC AUTO-ENTMCNC: 32 % (ref 30–34)
MCV RBC AUTO: 90 FL (ref 79–97)
MYELOCYTES # (MANUAL): 0 K/MM3
PLATELET # BLD: 286 K/MM3 (ref 140–440)
PROMYELOCYTES # (MANUAL): 0 K/MM3
RBC # BLD AUTO: 3.11 M/MM3 (ref 3.65–5.03)
TOTAL CELLS COUNTED BLD: 100

## 2021-03-07 RX ADMIN — INSULIN LISPRO SCH: 100 INJECTION, SOLUTION INTRAVENOUS; SUBCUTANEOUS at 22:27

## 2021-03-07 RX ADMIN — GABAPENTIN SCH MG: 100 CAPSULE ORAL at 22:27

## 2021-03-07 RX ADMIN — METOPROLOL TARTRATE SCH MG: 50 TABLET, FILM COATED ORAL at 22:27

## 2021-03-07 RX ADMIN — INSULIN LISPRO SCH: 100 INJECTION, SOLUTION INTRAVENOUS; SUBCUTANEOUS at 11:35

## 2021-03-07 RX ADMIN — INSULIN LISPRO SCH: 100 INJECTION, SOLUTION INTRAVENOUS; SUBCUTANEOUS at 07:49

## 2021-03-07 RX ADMIN — FAMOTIDINE SCH MG: 20 TABLET ORAL at 09:22

## 2021-03-07 RX ADMIN — GABAPENTIN SCH MG: 100 CAPSULE ORAL at 09:22

## 2021-03-07 RX ADMIN — METOPROLOL TARTRATE SCH MG: 50 TABLET, FILM COATED ORAL at 13:37

## 2021-03-07 RX ADMIN — Medication SCH ML: at 22:27

## 2021-03-07 RX ADMIN — Medication SCH ML: at 11:36

## 2021-03-07 RX ADMIN — INSULIN LISPRO SCH: 100 INJECTION, SOLUTION INTRAVENOUS; SUBCUTANEOUS at 17:34

## 2021-03-07 RX ADMIN — CINACALCET HYDROCHLORIDE SCH MG: 30 TABLET, FILM COATED ORAL at 09:22

## 2021-03-07 RX ADMIN — ASPIRIN SCH MG: 81 TABLET, CHEWABLE ORAL at 09:22

## 2021-03-07 RX ADMIN — DEXTROSE MONOHYDRATE PRN ML: 25 INJECTION, SOLUTION INTRAVENOUS at 07:15

## 2021-03-07 RX ADMIN — METOPROLOL TARTRATE SCH: 50 TABLET, FILM COATED ORAL at 09:23

## 2021-03-07 RX ADMIN — CLOPIDOGREL BISULFATE SCH MG: 75 TABLET ORAL at 09:22

## 2021-03-07 RX ADMIN — BISMUTH SUBSALICYLATE SCH MG: 525 LIQUID ORAL at 22:26

## 2021-03-07 NOTE — PROGRESS NOTE
Assessment and Plan





Assessment


(Principal problem)-Altered mental Status


ESRD on HD


Hyperkalemia


Hypokalemia


SVT


Hypertension


Anemia


Diarrhea











Plan: 





-Labs reviewed


-No acute indication for HD today


-Assess need for HD on daily basis


-Very gentle K+ repletion with 10 mEq po x 1 dose for hypokalemia


-Holding BP medications for now given hypotension


-Fluid restriction of 1 liter per day


-Monitor I/O's


-This pt undergoes outpatient HD at [facility] TTS


-Renal plan d/w Dr Audra Silva d/w Nurse





Subjective


Date of service: 03/07/21


Principal diagnosis: ESRD on HD


Interval history: 





Pt seen in bed, awake, denies shortness of breath, no acute distress





Objective





- Vital Signs


Vital signs: 


                               Vital Signs - 12hr











  03/06/21 03/06/21 03/07/21





  21:28 23:28 01:07


 


Temperature  98.2 F 97.3 F L


 


Pulse Rate 66 71 65


 


Respiratory  16 18





Rate   


 


Blood Pressure 92/56 88/54 105/66


 


Blood Pressure   





[Right]   


 


O2 Sat by Pulse  100 97





Oximetry   














  03/07/21 03/07/21





  03:56 08:51


 


Temperature 97.3 F L 97.9 F


 


Pulse Rate 73 60


 


Respiratory 18 18





Rate  


 


Blood Pressure 105/60 


 


Blood Pressure  108/60





[Right]  


 


O2 Sat by Pulse 99 95





Oximetry  














- General Appearance


General appearance: other


EENT: ATNC


Neck: no JVD


Respiratory: Present: Decreased Breath Sounds


Cardiology: regular, S1S2, other (ACCESS: Left AVF + thrill and bruit)


Gastrointestinal: normoactive bowel sounds, no tenderness


Integumentary: warm and dry


Neurologic: other (awake, oriented to person, place, and year, follows simple 

commands)


Musculoskeletal: other (no edema to BLE)





- Lab





                                 03/07/21 06:35





                                 03/07/21 06:35


                             Most recent lab results











Calcium  7.8 mg/dL (8.4-10.2)  L  03/07/21  06:35    


 


Phosphorus  6.80 mg/dL (2.5-4.5)  H  03/01/21  04:28    


 


Magnesium  2.00 mg/dL (1.7-2.3)   03/04/21  18:36    














Medications & Allergies





- Medications


Allergies/Adverse Reactions: 


                                    Allergies





diphenhydramine [From Benadryl] Allergy (Verified 08/05/19 02:48)


   Unknown


lisinopril Allergy (Verified 08/05/19 02:48)


   Unknown


seafood Allergy (Uncoded 04/26/20 11:58)


   Angioedema








Home Medications: 


                                Home Medications











 Medication  Instructions  Recorded  Confirmed  Last Taken  Type


 


Gabapentin 100 mg PO BID 03/14/20 03/02/21 03/08/20 History


 


traMADoL [Ultram 50 MG tab] 50 mg PO PRN 03/14/20 03/02/21 Unknown History


 


Cinacalcet HCl [Sensipar] 30 mg PO QDAY #30 04/26/20 03/02/21 Unknown Rx


 


Epoetin Jayy 10,000 Unit [Procrit] 10,000 unit IV SAVANNAH PRN #1 vial 04/26/20 03/02/21 Unknown Rx


 


Famotidine [Pepcid] 20 mg PO QDAY #30 04/26/20 03/02/21 Unknown Rx


 


cloNIDine 0.1 mg PO QDAY #30 04/26/20 03/02/21 03/08/20 Rx


 


Aspirin [Aspirin BABY CHEW TAB] 81 mg PO QDAY #30 tab.chew 10/19/20 03/02/21 

Unknown Rx


 


AtorvaSTATin [Lipitor] 20 mg PO QHS #30 tablet 10/19/20 03/02/21 Unknown Rx


 


Clopidogrel [Plavix] 75 mg PO QDAY #30 tablet 10/19/20 03/02/21 Unknown Rx


 


ISOSORBIDE MONOnitrate [Imdur ER] 30 mg PO QDAY #30 tablet 10/19/20 03/02/21 

Unknown Rx


 


Metoprolol Xl [Metoprolol 25 mg PO QDAY #30 tablet 10/19/20 03/02/21 Unknown Rx





SUCCINATE ER TAB]     











Active Medications: 














Generic Name Dose Route Start Last Admin





  Trade Name Freq  PRN Reason Stop Dose Admin


 


Acetaminophen  650 mg  02/27/21 22:22 





  Acetaminophen 325 Mg Tab  PO  





  Q4H PRN  





  Pain MILD(1-3)/Fever >100.5/HA  


 


Aspirin  81 mg  02/28/21 10:00  03/06/21 10:10





  Aspirin 81 Mg Tab Chew  PO   81 mg





  QDAY THU   Administration


 


Atorvastatin Calcium  20 mg  02/28/21 22:00  03/06/21 21:28





  Atorvastatin 20 Mg Tab  PO   20 mg





  QHS THU   Administration


 


Cinacalcet  30 mg  02/28/21 10:00  03/06/21 10:10





  Cinacalcet 30 Mg Tab  PO   30 mg





  QDAY THU   Administration


 


Clonidine HCl  0.1 mg  02/28/21 10:00  03/06/21 10:11





  Clonidine 0.1 Mg Tab  PO   Not Given





  QDAY [facility]  


 


Clopidogrel Bisulfate  75 mg  02/28/21 10:00  03/06/21 10:12





  Clopidogrel 75 Mg Tab  PO   75 mg





  QDAY THU   Administration


 


Dextrose  50 ml  02/27/21 22:22  03/07/21 07:15





  Dextrose 50% In Water (25gm) 50 Ml Syringe  IV   50 ml





  Q30MIN PRN   Administration





  Hypoglycemia  





  Protocol  


 


Famotidine  20 mg  02/28/21 10:00  03/06/21 10:10





  Famotidine 20 Mg Tab  PO   20 mg





  QDAY THU   Administration


 


Gabapentin  100 mg  02/28/21 10:00  03/06/21 21:28





  Gabapentin 100 Mg Cap  PO   100 mg





  BID THU   Administration


 


Insulin Human Lispro  0 unit  02/28/21 07:30  03/07/21 07:49





  Insulin Lispro 100 Unit/Ml  SUB-Q   Not Given





  ACHS [facility]  





  Protocol  


 


Isosorbide Mononitrate  30 mg  02/28/21 10:00  03/06/21 10:11





  Isosorbide Mononitrate Er 30 Mg Tab  PO   Not Given





  QDAY [facility]  


 


Magnesium Hydroxide  30 ml  02/27/21 22:22 





  Magnesium Hydroxide (Mom) Oral Liqd Udc  PO  





  Q4H PRN  





  Constipation  


 


Metoprolol Tartrate  50 mg  03/02/21 14:00  03/06/21 21:28





  Metoprolol Tartrate 50 Mg Tab  PO   Not Given





  TID [facility]  


 


Metronidazole  500 mg  03/07/21 14:00 





  Metronidazole 500 Mg Tab  PO  





  Q8HR [facility]  





  Protocol  


 


Morphine Sulfate  2 mg  02/27/21 22:22 





  Morphine 2 Mg/1 Ml Inj  IV  





  Q4H PRN  





  Pain, Moderate (4-6)  


 


Ondansetron HCl  4 mg  02/27/21 22:22 





  Ondansetron 4 Mg/2 Ml Inj  IV  





  Q8H PRN  





  Nausea And Vomiting  


 


Sodium Chloride  10 ml  02/28/21 10:00  03/06/21 21:29





  Sodium Chloride 0.9% 10 Ml Flush Syringe  IV   10 ml





  BID THU   Administration


 


Sodium Chloride  10 ml  02/27/21 22:22  03/02/21 06:41





  Sodium Chloride 0.9% 10 Ml Flush Syringe  IV   10 ml





  PRN PRN   Administration





  LINE FLUSH

## 2021-03-07 NOTE — PROGRESS NOTE
Assessment and Plan





ssessment and Plan


Assessment and plan: 


--Dysphagia/patient refusing to eat 


Resolved





--Acute toxic metabolic encephalopathy


Current Visit: Yes   Status: Acute   


Plan to address problem: 


Resolved





--Diarrhea 


Current Visit: Yes   Status: Acute . 


Plan to address problem: 


Improved





--Severe sepsis:


Current Visit: Yes   Status: Acute . 


Plan to address problem: 


Improved





--Hyperkalemia


Current Visit: Yes   Status: Acute .  


Plan to address problem: 


Resolved





--ESRD on hemodialysis TTS


Current Visit: Yes   Status: Chronic   


Plan to address problem: 


Hemodialysis per schedule





--Noncompliance with HD


Current Visit: Yes   Status: Acute   


Plan to address problem: 


Patient strongly advised to comply with medications diet 


follow-up visit especially hemodialysis per schedule





--SVT (supraventricular tachycardia)


Current Visit: Yes   Status: Acute   


Plan to address problem: 


In sinus rhythm





--Chronic elevation of troponin/NSTEMI 2


Current Visit: Yes   Status: Acute   


Plan to address problem: 


Probably due to ESRD


However patient has multiple risk factors.  


Cardiology consulted





--PUI /negative  COVID-19 test


Current Visit: Yes   Status: Acute   


Plan to address problem: 


DC isolation, corona PCR test negative





--DM2 (diabetes mellitus, type 2)


Current Visit: No   Status: Chronic   


Plan to address problem: 


Accu-Chek sliding scale coverage ADA diet


Insulin as needed





--Hypertensive urgency; POA


Current Visit: No   Status: Chronic.  


Now moderate control, continue current antihypertensives


As needed hydralazine   





--DVT prophylaxis


Current Visit: No   Status: Acute   


Plan to address problem: 


Continue subcu heparin renal dose





--Full code status


Current Visit: Yes   Status: Acute   


Plan to address problem: 





Subjective


Date of service: 03/07/21


Principal diagnosis: ESRD on HD


Interval history: 








Brief history


50-year-old -American female with known history of end-stage renal 

disease on dialysis, diabetes mellitus, CVA and history of SVT presenting to the

emergency room today via EMS with altered mental status.  Upon arrival in the 

emergency room patient was found to be in SVT and also had a fever.  Temperature

was said to be about 102 F.  Patient received 6 mg of adenosine and 

subsequently 12 mg of adenosine in route to the hospital.  Subsequent rhythm was

said to have been a sinus tachycardia.  She had an initial heart rate of about 

200 which later improved to about 140.


Patient was evaluated by infectious diseases, nephrology, received hemodialysis 

per schedule, patient symptoms slightly improved





3/1; patient is more alert and awake facial swelling slightly improved, on 

empiric antibiotics per ID


Refusing to eat, speech therapy consulted, follow speech and swallow, Dobbhoff 

placement for medications and feeds if no improvement





3/2/2021


Patient lethargic and unable to eat





3/3/2021


Patient lethargic and unable to eat





3/4/2021


More alert and oriented


Has loose bowel movements 4-5 in a day


Otherwise ready for discharge





3/5/2021


Diarrhea persists


Altered sensorium resolved





3/6/2021


Nepro stopped


Diarrhea improved


Flagyl also discontinued


C. difficile came negative





3/7/2021


Diarrhea improved


Patient to be discharged tomorrow after hemodialysis








History


Interval history: 


I have seen and examined the patient at the bedside


Patient's chart and medications reviewed


Patient received hemodialysis today


More alert and awake, minimally communicative


Refusing to take oral


Pending speech therapy evaluation




















Objective





- Constitutional


Vitals: 


                               Vital Signs - 12hr











  03/07/21 03/07/21 03/07/21





  03:56 08:51 09:23


 


Temperature 97.3 F L 97.9 F 


 


Pulse Rate 73 60 58 L


 


Pulse Rate [   





Right Radial]   


 


Respiratory 18 18 





Rate   


 


Blood Pressure 105/60  108/60


 


Blood Pressure  108/60 





[Right]   


 


O2 Sat by Pulse 99 95 





Oximetry   














  03/07/21 03/07/21 03/07/21





  10:00 11:09 11:35


 


Temperature  97.9 F 


 


Pulse Rate  74 62


 


Pulse Rate [ 62  





Right Radial]   


 


Respiratory 18 16 





Rate   


 


Blood Pressure  95/55 96/58


 


Blood Pressure   





[Right]   


 


O2 Sat by Pulse  96 





Oximetry   














  03/07/21





  13:37


 


Temperature 


 


Pulse Rate 84


 


Pulse Rate [ 





Right Radial] 


 


Respiratory 





Rate 


 


Blood Pressure 106/58


 


Blood Pressure 





[Right] 


 


O2 Sat by Pulse 





Oximetry 











General appearance: Present: no acute distress, well-nourished





- EENT


Eyes: PERRL, EOM intact


ENT: hearing intact, clear oral mucosa


Ears: bilateral: normal





- Neck


Neck: supple, normal ROM





- Respiratory


Respiratory effort: normal


Respiratory: bilateral: CTA





- Breasts


Breasts: normal





- Cardiovascular


Heart rate: 78


Rhythm: regular


Heart Sounds: Present: S1 & S2.  Absent: gallop, rub


Extremities: pulses intact, No edema, normal color, Full ROM





- Gastrointestinal


General gastrointestinal: Present: soft, non-tender, non-distended, normal bowel

 sounds





- Genitourinary


Female genitourinary: normal





- Integumentary


Integumentary: clear, warm, dry





- Musculoskeletal


Musculoskeletal: 1, strength equal bilaterally





- Neurologic


Neurologic: moves all extremities





- Psychiatric


Psychiatric: memory intact, appropriate mood/affect, intact judgment & insight





- Labs


CBC & Chem 7: 


                                 03/08/21 06:58





                                 03/08/21 06:58


Labs: 


                              Abnormal lab results











  03/06/21 03/06/21 03/07/21 Range/Units





  15:57 16:01 06:35 


 


RBC    3.11 L  (3.65-5.03)  M/mm3


 


Hgb    9.0 L  (10.1-14.3)  gm/dl


 


Hct    28.0 L  (30.3-42.9)  %


 


RDW    16.6 H  (13.2-15.2)  %


 


Monocytes % (Manual)    9.0 H  (0.0-7.3)  %


 


Eosinophils % (Manual)    12.0 H  (0.0-4.3)  %


 


Eosinophils # (Manual)    0.7 H  (0.0-0.4)  K/mm3


 


Potassium   3.2 L   (3.6-5.0)  mmol/L


 


Creatinine     (0.6-1.2)  mg/dL


 


POC Glucose  63 L    ()  mg/dL


 


Calcium     (8.4-10.2)  mg/dL


 


ALT     (7-56)  units/L


 


Alkaline Phosphatase     ()  units/L


 


Albumin     (3.9-5)  g/dL














  03/07/21 03/07/21 03/07/21 Range/Units





  06:35 07:25 08:15 


 


RBC     (3.65-5.03)  M/mm3


 


Hgb     (10.1-14.3)  gm/dl


 


Hct     (30.3-42.9)  %


 


RDW     (13.2-15.2)  %


 


Monocytes % (Manual)     (0.0-7.3)  %


 


Eosinophils % (Manual)     (0.0-4.3)  %


 


Eosinophils # (Manual)     (0.0-0.4)  K/mm3


 


Potassium  3.3 L    (3.6-5.0)  mmol/L


 


Creatinine  4.5 H    (0.6-1.2)  mg/dL


 


POC Glucose   48 L  132 H  ()  mg/dL


 


Calcium  7.8 L    (8.4-10.2)  mg/dL


 


ALT  < 5 L    (7-56)  units/L


 


Alkaline Phosphatase  366 H    ()  units/L


 


Albumin  2.6 L    (3.9-5)  g/dL














  03/07/21 Range/Units





  11:17 


 


RBC   (3.65-5.03)  M/mm3


 


Hgb   (10.1-14.3)  gm/dl


 


Hct   (30.3-42.9)  %


 


RDW   (13.2-15.2)  %


 


Monocytes % (Manual)   (0.0-7.3)  %


 


Eosinophils % (Manual)   (0.0-4.3)  %


 


Eosinophils # (Manual)   (0.0-0.4)  K/mm3


 


Potassium   (3.6-5.0)  mmol/L


 


Creatinine   (0.6-1.2)  mg/dL


 


POC Glucose  54 L  ()  mg/dL


 


Calcium   (8.4-10.2)  mg/dL


 


ALT   (7-56)  units/L


 


Alkaline Phosphatase   ()  units/L


 


Albumin   (3.9-5)  g/dL














HEART Score





- HEART Score


Troponin: 


                                        











Troponin T  0.395 ng/mL (0.00-0.029)  H* D 03/02/21  13:11

## 2021-03-08 VITALS — SYSTOLIC BLOOD PRESSURE: 106 MMHG | DIASTOLIC BLOOD PRESSURE: 58 MMHG

## 2021-03-08 LAB
ALBUMIN SERPL-MCNC: 3 G/DL (ref 3.9–5)
ALT SERPL-CCNC: < 5 UNITS/L (ref 7–56)
BASOPHILS # (AUTO): 0.1 K/MM3 (ref 0–0.1)
BASOPHILS NFR BLD AUTO: 1 % (ref 0–1.8)
BUN SERPL-MCNC: 11 MG/DL (ref 7–17)
BUN/CREAT SERPL: 2 %
CALCIUM SERPL-MCNC: 8.2 MG/DL (ref 8.4–10.2)
EOSINOPHIL # BLD AUTO: 0.9 K/MM3 (ref 0–0.4)
EOSINOPHIL NFR BLD AUTO: 14.6 % (ref 0–4.3)
HCT VFR BLD CALC: 30.8 % (ref 30.3–42.9)
HEMOLYSIS INDEX: 0
HGB BLD-MCNC: 9.8 GM/DL (ref 10.1–14.3)
LYMPHOCYTES # BLD AUTO: 1.9 K/MM3 (ref 1.2–5.4)
LYMPHOCYTES NFR BLD AUTO: 31.6 % (ref 13.4–35)
MCHC RBC AUTO-ENTMCNC: 32 % (ref 30–34)
MCV RBC AUTO: 92 FL (ref 79–97)
MONOCYTES # (AUTO): 0.9 K/MM3 (ref 0–0.8)
MONOCYTES % (AUTO): 14.6 % (ref 0–7.3)
PLATELET # BLD: 274 K/MM3 (ref 140–440)
RBC # BLD AUTO: 3.33 M/MM3 (ref 3.65–5.03)

## 2021-03-08 PROCEDURE — 5A1D70Z PERFORMANCE OF URINARY FILTRATION, INTERMITTENT, LESS THAN 6 HOURS PER DAY: ICD-10-PCS | Performed by: INTERNAL MEDICINE

## 2021-03-08 RX ADMIN — CLOPIDOGREL BISULFATE SCH MG: 75 TABLET ORAL at 10:16

## 2021-03-08 RX ADMIN — ASPIRIN SCH MG: 81 TABLET, CHEWABLE ORAL at 10:16

## 2021-03-08 RX ADMIN — INSULIN LISPRO SCH: 100 INJECTION, SOLUTION INTRAVENOUS; SUBCUTANEOUS at 12:16

## 2021-03-08 RX ADMIN — BISMUTH SUBSALICYLATE SCH MG: 525 LIQUID ORAL at 10:17

## 2021-03-08 RX ADMIN — EPOETIN ALFA-EPBX PRN UNIT: 10000 INJECTION, SOLUTION INTRAVENOUS; SUBCUTANEOUS at 16:00

## 2021-03-08 RX ADMIN — METOPROLOL TARTRATE SCH MG: 50 TABLET, FILM COATED ORAL at 10:16

## 2021-03-08 RX ADMIN — GABAPENTIN SCH MG: 100 CAPSULE ORAL at 10:16

## 2021-03-08 RX ADMIN — Medication SCH ML: at 10:17

## 2021-03-08 RX ADMIN — CINACALCET HYDROCHLORIDE SCH MG: 30 TABLET, FILM COATED ORAL at 10:16

## 2021-03-08 RX ADMIN — DEXTROSE MONOHYDRATE PRN ML: 25 INJECTION, SOLUTION INTRAVENOUS at 12:16

## 2021-03-08 RX ADMIN — INSULIN LISPRO SCH: 100 INJECTION, SOLUTION INTRAVENOUS; SUBCUTANEOUS at 07:53

## 2021-03-08 RX ADMIN — FAMOTIDINE SCH MG: 20 TABLET ORAL at 10:16

## 2021-03-08 NOTE — PROGRESS NOTE
Assessment and Plan








Assessment:


(Principal problem)-Altered mental Status


(Principal problem)-Hyperkalemia


(Principal problem)-SVT


End Stage Renal Disease


Hypertension


Anemia











Plan: 


-Hemodialysis today for UF and clearance 


-Fluid restriction of 1 liter per day


-Low potassium diet


-Monitor I/O's


-Obtain daily weights


-Assess dialysis needs daily





Subjective


Date of service: 03/08/21


Principal diagnosis: ESRD on HD


Interval history: 





Patient seen awake in bed. No family at bedside.





Objective





- Vital Signs


Vital signs: 


                               Vital Signs - 12hr











  03/08/21 03/08/21 03/08/21





  00:00 00:08 05:08


 


Temperature  98.0 F 98.0 F


 


Pulse Rate 72 76 71


 


Respiratory  20 18





Rate   


 


Blood Pressure  99/64 112/70


 


O2 Sat by Pulse  100 100





Oximetry   














  03/08/21





  07:34


 


Temperature 98.4 F


 


Pulse Rate 71


 


Respiratory 





Rate 


 


Blood Pressure 150/57


 


O2 Sat by Pulse 100





Oximetry 














- General Appearance


General appearance: well-developed, appears stated age, fatigue


EENT: ATNC


Neck: no JVD, supple


Respiratory: Present: Decreased Breath Sounds


Cardiology: S1S2


Gastrointestinal: normoactive bowel sounds


Integumentary: warm and dry


Neurologic: other (awake)


Musculoskeletal: other (No edema)





- Lab





                                 03/08/21 06:58





                                 03/08/21 06:58


                             Most recent lab results











Calcium  8.2 mg/dL (8.4-10.2)  L  03/08/21  06:58    


 


Phosphorus  6.80 mg/dL (2.5-4.5)  H  03/01/21  04:28    


 


Magnesium  2.00 mg/dL (1.7-2.3)   03/04/21  18:36    














Medications & Allergies





- Medications


Allergies/Adverse Reactions: 


                                    Allergies





diphenhydramine [From Benadryl] Allergy (Verified 08/05/19 02:48)


   Unknown


lisinopril Allergy (Verified 08/05/19 02:48)


   Unknown


seafood Allergy (Uncoded 04/26/20 11:58)


   Angioedema








Home Medications: 


                                Home Medications











 Medication  Instructions  Recorded  Confirmed  Last Taken  Type


 


Gabapentin 100 mg PO BID 03/14/20 03/02/21 03/08/20 History


 


traMADoL [Ultram 50 MG tab] 50 mg PO PRN 03/14/20 03/02/21 Unknown History


 


Cinacalcet HCl [Sensipar] 30 mg PO QDAY #30 04/26/20 03/02/21 Unknown Rx


 


Epoetin Jayy 10,000 Unit [Procrit] 10,000 unit IV SAVANNAH PRN #1 vial 04/26/20 03/02/21 Unknown Rx


 


Famotidine [Pepcid] 20 mg PO QDAY #30 04/26/20 03/02/21 Unknown Rx


 


cloNIDine 0.1 mg PO QDAY #30 04/26/20 03/02/21 03/08/20 Rx


 


Aspirin [Aspirin BABY CHEW TAB] 81 mg PO QDAY #30 tab.chew 10/19/20 03/02/21 

Unknown Rx


 


AtorvaSTATin [Lipitor] 20 mg PO QHS #30 tablet 10/19/20 03/02/21 Unknown Rx


 


Clopidogrel [Plavix] 75 mg PO QDAY #30 tablet 10/19/20 03/02/21 Unknown Rx


 


ISOSORBIDE MONOnitrate [Imdur ER] 30 mg PO QDAY #30 tablet 10/19/20 03/02/21 

Unknown Rx


 


Metoprolol Xl [Metoprolol 25 mg PO QDAY #30 tablet 10/19/20 03/02/21 Unknown Rx





SUCCINATE ER TAB]     











Active Medications: 














Generic Name Dose Route Start Last Admin





  Trade Name Freq  PRN Reason Stop Dose Admin


 


Acetaminophen  650 mg  02/27/21 22:22 





  Acetaminophen 325 Mg Tab  PO  





  Q4H PRN  





  Pain MILD(1-3)/Fever >100.5/HA  


 


Aspirin  81 mg  02/28/21 10:00  03/08/21 10:16





  Aspirin 81 Mg Tab Chew  PO   81 mg





  QDAY THU   Administration


 


Atorvastatin Calcium  20 mg  02/28/21 22:00  03/07/21 22:27





  Atorvastatin 20 Mg Tab  PO   20 mg





  QHS THU   Administration


 


Bismuth Subsalicylate  262 mg  03/07/21 22:00  03/08/21 10:17





  Bismuth Subsalicylate 262 Mg/15 Ml Oral Liqd  PO  03/09/21 06:00  262 mg





  BID THU   Administration


 


Cinacalcet  30 mg  02/28/21 10:00  03/08/21 10:16





  Cinacalcet 30 Mg Tab  PO   30 mg





  QDAY THU   Administration


 


Clopidogrel Bisulfate  75 mg  02/28/21 10:00  03/08/21 10:16





  Clopidogrel 75 Mg Tab  PO   75 mg





  QDAY THU   Administration


 


Dextrose  50 ml  02/27/21 22:22  03/07/21 07:15





  Dextrose 50% In Water (25gm) 50 Ml Syringe  IV   50 ml





  Q30MIN PRN   Administration





  Hypoglycemia  





  Protocol  


 


Famotidine  20 mg  02/28/21 10:00  03/08/21 10:16





  Famotidine 20 Mg Tab  PO   20 mg





  QDAY THU   Administration


 


Gabapentin  100 mg  02/28/21 10:00  03/08/21 10:16





  Gabapentin 100 Mg Cap  PO   100 mg





  BID THU   Administration


 


Insulin Human Lispro  0 unit  02/28/21 07:30  03/08/21 07:53





  Insulin Lispro 100 Unit/Ml  SUB-Q   Not Given





  ACHS THU  





  Protocol  


 


Isosorbide Mononitrate  30 mg  02/28/21 10:00  03/08/21 10:16





  Isosorbide Mononitrate Er 30 Mg Tab  PO   30 mg





  QDAY THU   Administration


 


Magnesium Hydroxide  30 ml  02/27/21 22:22 





  Magnesium Hydroxide (Mom) Oral Liqd Udc  PO  





  Q4H PRN  





  Constipation  


 


Metoprolol Tartrate  50 mg  03/07/21 22:00  03/08/21 10:16





  Metoprolol Tartrate 50 Mg Tab  PO   50 mg





  BID THU   Administration


 


Metronidazole  500 mg  03/07/21 14:00  03/08/21 05:06





  Metronidazole 500 Mg Tab  PO   500 mg





  Q8HR THU   Administration





  Protocol  


 


Morphine Sulfate  2 mg  02/27/21 22:22 





  Morphine 2 Mg/1 Ml Inj  IV  





  Q4H PRN  





  Pain, Moderate (4-6)  


 


Ondansetron HCl  4 mg  02/27/21 22:22 





  Ondansetron 4 Mg/2 Ml Inj  IV  





  Q8H PRN  





  Nausea And Vomiting  


 


Sodium Chloride  10 ml  02/28/21 10:00  03/08/21 10:17





  Sodium Chloride 0.9% 10 Ml Flush Syringe  IV   10 ml





  BID THU   Administration


 


Sodium Chloride  10 ml  02/27/21 22:22  03/02/21 06:41





  Sodium Chloride 0.9% 10 Ml Flush Syringe  IV   10 ml





  PRN PRN   Administration





  LINE FLUSH

## 2021-03-08 NOTE — DISCHARGE SUMMARY
Providers





- Providers


Date of Admission: 


02/27/21 21:59





Date of discharge: 03/08/21


Attending physician: 


JAYCE PILLAI





                                        





02/27/21 21:56


Consult to Physician [CONS] Stat 


   Comment: Dr. Rodriges spoke with Dr. Baker @ [id number]


   Consulting Provider: MOOKIE BAKER


   Physician Instructions: 


   Reason For Exam: uremic complications ESRD on HD





02/27/21 22:25


Consult to Dietitian/Nutrition [CONS] Routine 


   Physician Instructions: 


   Reason For Exam: 


   Reason for Consult: Diet education





02/27/21 23:33


Consult to Physician [CONS] Routine 


   Comment: 


   Consulting Provider: MIKHAIL STOLL


   Physician Instructions: 


   Reason For Exam: Fever,Altered mental status  R/O covid-19





03/01/21 14:19


Speech Therapy Evaluation and Treat [CONS] Urgent 


   Reason For Exam: swallow evaluation





03/04/21 10:40


Occupational Therapy Evaluate and Treat [CONS] Routine 


   Comment: 


   Reason For Exam: Eval & Treat


Physical Therapy Evaluation and Treat [CONS] Routine 


   Comment: 


   Reason For Exam: Eval & Treat











Primary care physician: 


PRIMARY CARE MD








Hospitalization


Condition: Fair


Hospital course: 





50-year-old -American female with known history of end-stage renal disea

se on dialysis, diabetes mellitus, CVA and history of SVT presenting to the 

emergency room today via EMS with altered mental status.  Upon arrival in the 

emergency room patient was found to be in SVT and also had a fever.  Temperature

 was said to be about 102 F.  Patient received 6 mg of adenosine and 

subsequently 12 mg of adenosine in route to the hospital.  Subsequent rhythm was

 said to have been a sinus tachycardia.  She had an initial heart rate of about 

200 which later improved to about 140.


Patient was evaluated by infectious diseases, nephrology, received hemodialysis 

per schedule, patient symptoms slightly improved





3/1; patient is more alert and awake facial swelling slightly improved, on 

empiric antibiotics per ID


Refusing to eat, speech therapy consulted, follow speech and swallow, Dobbhoff 

placement for medications and feeds if no improvement





3/2/2021


Patient lethargic and unable to eat





3/3/2021


Patient lethargic and unable to eat





3/4/2021


More alert and oriented


Has loose bowel movements 4-5 in a day


Otherwise ready for discharge





3/5/2021


Diarrhea persists


Altered sensorium resolved





3/6/2021


Nepro stopped


Diarrhea improved


Flagyl also discontinued


C. difficile came negative





3/7/2021


Diarrhea improved


Patient to be discharged tomorrow after hemodialysis





3/8/2021


Patient nearly baseline


Patient needs home health and physical therapy

















Assessment and Plan


Assessment and plan: 


--Dysphagia/patient refusing to eat 


Resolved





--Acute toxic metabolic encephalopathy


Current Visit: Yes   Status: Acute   


Plan to address problem: 


Resolved





--Diarrhea 


Current Visit: Yes   Status: Acute . 


Plan to address problem: 


Improved





--Severe sepsis:


Current Visit: Yes   Status: Acute . 


Plan to address problem: 


Improved





--Hyperkalemia


Current Visit: Yes   Status: Acute .  


Plan to address problem: 


Resolved





--ESRD on hemodialysis TTS


Current Visit: Yes   Status: Chronic   


Plan to address problem: 


Hemodialysis per schedule





--Noncompliance with HD


Current Visit: Yes   Status: Acute   


Plan to address problem: 


Patient strongly advised to comply with medications diet 


follow-up visit especially hemodialysis per schedule





--SVT (supraventricular tachycardia)


Current Visit: Yes   Status: Acute   


Plan to address problem: 


In sinus rhythm





--Chronic elevation of troponin/NSTEMI 2


Current Visit: Yes   Status: Acute   


Plan to address problem: 


Probably due to ESRD


However patient has multiple risk factors.  


Cardiology consulted





--PUI /negative  COVID-19 test


Current Visit: Yes   Status: Acute   


Plan to address problem: 


DC isolation, corona PCR test negative





--DM2 (diabetes mellitus, type 2)


Current Visit: No   Status: Chronic   


Plan to address problem: 


Accu-Chek sliding scale coverage ADA diet


Insulin as needed





--Hypertensive urgency; POA


Current Visit: No   Status: Chronic.  


Now moderate control, continue current antihypertensives


As needed hydralazine   





--DVT prophylaxis


Current Visit: No   Status: Acute   


Plan to address problem: 


Continue subcu heparin renal dose





--Full code status


Current Visit: Yes   Status: Acute   


Plan to address problem: 





Subjective


Date of service: 03/07/21


Principal diagnosis: ESRD on HD


Interval history: 











Disposition: DC-01 TO HOME OR SELFCARE





- Discharge Diagnoses


(1) Acute metabolic encephalopathy


Status: Acute   Comment: Improved   





(2) Sepsis


Status: Acute   Comment: Improved   





(3) Supraventricular tachycardia


Status: Acute   





(4) Hyperkalemia


Status: Acute   





(5) Missed dialysis


Status: Acute   





(6) DM2 (diabetes mellitus, type 2)


Status: Chronic   





(7) ESRD (end stage renal disease) on dialysis


Status: Chronic   





(8) HTN (hypertension)


Status: Chronic   


Qualifiers: 


   Hypertension type: essential hypertension   Qualified Code(s): I10 - 

Essential (primary) hypertension   





Core Measure Documentation





- Palliative Care


Palliative Care/ Comfort Measures: Not Applicable





- Core Measures


Any of the following diagnoses?: none





Exam





- Constitutional


Vitals: 


                                        











Temp Pulse Resp BP Pulse Ox


 


 98.8 F   69   18   89/50   100 


 


 03/08/21 13:25  03/08/21 14:30  03/08/21 13:25  03/08/21 14:30  03/08/21 12:05











General appearance: Present: no acute distress, well-nourished





- EENT


Eyes: Present: PERRL


ENT: hearing intact, clear oral mucosa





- Neck


Neck: Present: supple, normal ROM





- Respiratory


Respiratory effort: normal


Respiratory: bilateral: CTA





- Cardiovascular


Heart rate: 78


Heart Sounds: Present: S1 & S2.  Absent: rub, click





- Extremities


Extremities: pulses symmetrical, No edema


Peripheral Pulses: within normal limits





- Abdominal


General gastrointestinal: Present: soft, non-tender, non-distended, normal bowel

 sounds


Female genitourinary: Present: normal





- Integumentary


Integumentary: Present: clear, warm, dry





- Musculoskeletal


Musculoskeletal: gait normal, strength equal bilaterally





- Psychiatric


Psychiatric: appropriate mood/affect, intact judgment & insight





- Neurologic


Neurologic: CNII-XII intact, moves all extremities





Plan


Activity: no restrictions


Diet: renal


Follow up with: 


PRIMARY CARE,MD [Primary Care Provider] - 7 Days